# Patient Record
Sex: FEMALE | Race: WHITE | Employment: OTHER | URBAN - METROPOLITAN AREA
[De-identification: names, ages, dates, MRNs, and addresses within clinical notes are randomized per-mention and may not be internally consistent; named-entity substitution may affect disease eponyms.]

---

## 2017-09-12 ENCOUNTER — TELEPHONE (OUTPATIENT)
Dept: SURGERY | Age: 77
End: 2017-09-12

## 2017-09-12 DIAGNOSIS — Z12.31 ENCOUNTER FOR SCREENING MAMMOGRAM FOR BREAST CANCER: Primary | ICD-10-CM

## 2017-09-13 ENCOUNTER — HOSPITAL ENCOUNTER (OUTPATIENT)
Dept: WOMENS IMAGING | Age: 77
Discharge: HOME | End: 2017-09-13
Payer: MEDICARE

## 2017-09-13 DIAGNOSIS — Z12.31 ENCOUNTER FOR SCREENING MAMMOGRAM FOR BREAST CANCER: ICD-10-CM

## 2017-09-13 PROCEDURE — G0202 SCR MAMMO BI INCL CAD: HCPCS

## 2017-09-14 ENCOUNTER — TELEPHONE (OUTPATIENT)
Dept: SURGERY | Age: 77
End: 2017-09-14

## 2017-09-14 DIAGNOSIS — R92.8 ABNORMAL MAMMOGRAM: Primary | ICD-10-CM

## 2017-09-21 ENCOUNTER — HOSPITAL ENCOUNTER (OUTPATIENT)
Dept: WOMENS IMAGING | Age: 77
Discharge: HOME | End: 2017-09-21
Payer: MEDICARE

## 2017-09-21 ENCOUNTER — HOSPITAL ENCOUNTER (OUTPATIENT)
Dept: ULTRASOUND IMAGING | Age: 77
End: 2017-09-21
Payer: MEDICARE

## 2017-09-21 DIAGNOSIS — R92.8 ABNORMAL MAMMOGRAM: ICD-10-CM

## 2017-09-21 PROCEDURE — G0204 DX MAMMO INCL CAD BI: HCPCS

## 2018-05-23 ENCOUNTER — TELEPHONE (OUTPATIENT)
Dept: SURGERY | Age: 78
End: 2018-05-23

## 2018-05-23 DIAGNOSIS — Z09 FOLLOW-UP EXAM, 3-6 MONTHS SINCE PREVIOUS EXAM: Primary | ICD-10-CM

## 2018-05-23 DIAGNOSIS — R92.8 ABNORMAL MAMMOGRAM OF BOTH BREASTS: ICD-10-CM

## 2018-05-23 DIAGNOSIS — R92.8 ABNORMAL MAMMOGRAM: ICD-10-CM

## 2018-06-01 ENCOUNTER — HOSPITAL ENCOUNTER (OUTPATIENT)
Dept: WOMENS IMAGING | Age: 78
Discharge: HOME | End: 2018-06-03
Payer: MEDICARE

## 2018-06-01 DIAGNOSIS — R92.8 ABNORMAL MAMMOGRAM: ICD-10-CM

## 2018-06-01 PROCEDURE — 77066 DX MAMMO INCL CAD BI: CPT

## 2019-05-17 DIAGNOSIS — R92.8 ABNORMAL MAMMOGRAM: Primary | ICD-10-CM

## 2019-05-30 ENCOUNTER — HOSPITAL ENCOUNTER (OUTPATIENT)
Dept: WOMENS IMAGING | Age: 79
Discharge: HOME | End: 2019-06-01
Payer: MEDICARE

## 2019-05-30 DIAGNOSIS — R92.8 ABNORMAL MAMMOGRAM: ICD-10-CM

## 2019-05-30 PROCEDURE — 77066 DX MAMMO INCL CAD BI: CPT

## 2023-03-02 PROBLEM — E55.9 VITAMIN D DEFICIENCY: Status: ACTIVE | Noted: 2023-03-02

## 2023-03-02 PROBLEM — J30.9 ALLERGIC RHINITIS: Status: ACTIVE | Noted: 2023-03-02

## 2023-03-02 PROBLEM — M19.90 OSTEOARTHRITIS: Status: ACTIVE | Noted: 2023-03-02

## 2023-03-02 PROBLEM — E03.9 ACQUIRED HYPOTHYROIDISM: Status: ACTIVE | Noted: 2023-03-02

## 2023-03-02 PROBLEM — E78.2 MIXED HYPERLIPIDEMIA: Status: ACTIVE | Noted: 2023-03-02

## 2023-03-02 PROBLEM — M13.0 POLYARTHRITIS: Status: ACTIVE | Noted: 2023-03-02

## 2023-03-02 PROBLEM — M65.4 TENOSYNOVITIS, DE QUERVAIN: Status: ACTIVE | Noted: 2023-03-02

## 2023-03-02 PROBLEM — M05.79 RHEUMATOID ARTHRITIS INVOLVING MULTIPLE SITES WITH POSITIVE RHEUMATOID FACTOR (MULTI): Status: ACTIVE | Noted: 2023-03-02

## 2023-03-02 PROBLEM — G89.29 CHRONIC PAIN OF RIGHT WRIST: Status: ACTIVE | Noted: 2023-03-02

## 2023-03-02 PROBLEM — M25.531 CHRONIC PAIN OF RIGHT WRIST: Status: ACTIVE | Noted: 2023-03-02

## 2023-03-02 PROBLEM — R05.3 CHRONIC COUGH: Status: ACTIVE | Noted: 2023-03-02

## 2023-03-02 PROBLEM — M85.80 OSTEOPENIA: Status: ACTIVE | Noted: 2023-03-02

## 2023-03-02 PROBLEM — N30.20 CHRONIC CYSTITIS: Status: ACTIVE | Noted: 2023-03-02

## 2023-03-02 PROBLEM — I10 ESSENTIAL HYPERTENSION: Status: ACTIVE | Noted: 2023-03-02

## 2023-03-02 PROBLEM — J45.909 REACTIVE AIRWAY DISEASE (HHS-HCC): Status: ACTIVE | Noted: 2023-03-02

## 2023-03-02 RX ORDER — GINSENG 100 MG
1 CAPSULE ORAL DAILY
COMMUNITY

## 2023-03-02 RX ORDER — LEVOTHYROXINE SODIUM 50 UG/1
1 TABLET ORAL DAILY
COMMUNITY
Start: 2021-07-14 | End: 2023-04-17 | Stop reason: SDUPTHER

## 2023-03-02 RX ORDER — CHOLESTYRAMINE 4 G/4.8G
4 POWDER, FOR SUSPENSION ORAL
COMMUNITY
Start: 2021-11-12 | End: 2023-07-24 | Stop reason: ALTCHOICE

## 2023-03-02 RX ORDER — FOLIC ACID/MULTIVIT,IRON,MINER 0.4MG-18MG
1 TABLET ORAL DAILY
COMMUNITY

## 2023-03-02 RX ORDER — BIOTIN 10 MG
1 TABLET ORAL DAILY
COMMUNITY

## 2023-03-02 RX ORDER — LOSARTAN POTASSIUM AND HYDROCHLOROTHIAZIDE 25; 100 MG/1; MG/1
1 TABLET ORAL DAILY
COMMUNITY
Start: 2021-08-19 | End: 2023-04-17 | Stop reason: SDUPTHER

## 2023-03-02 RX ORDER — ALBUTEROL SULFATE 90 UG/1
2 AEROSOL, METERED RESPIRATORY (INHALATION) EVERY 6 HOURS PRN
COMMUNITY
Start: 2022-06-19

## 2023-04-17 ENCOUNTER — LAB (OUTPATIENT)
Dept: LAB | Facility: LAB | Age: 83
End: 2023-04-17
Payer: MEDICARE

## 2023-04-17 ENCOUNTER — OFFICE VISIT (OUTPATIENT)
Dept: PRIMARY CARE | Facility: CLINIC | Age: 83
End: 2023-04-17
Payer: MEDICARE

## 2023-04-17 VITALS
DIASTOLIC BLOOD PRESSURE: 80 MMHG | HEIGHT: 64 IN | WEIGHT: 222 LBS | BODY MASS INDEX: 37.9 KG/M2 | RESPIRATION RATE: 16 BRPM | SYSTOLIC BLOOD PRESSURE: 124 MMHG | HEART RATE: 72 BPM | TEMPERATURE: 97.2 F

## 2023-04-17 DIAGNOSIS — E03.9 ACQUIRED HYPOTHYROIDISM: ICD-10-CM

## 2023-04-17 DIAGNOSIS — L85.3 XEROSIS CUTIS: ICD-10-CM

## 2023-04-17 DIAGNOSIS — M05.79 RHEUMATOID ARTHRITIS INVOLVING MULTIPLE SITES WITH POSITIVE RHEUMATOID FACTOR (MULTI): ICD-10-CM

## 2023-04-17 DIAGNOSIS — E78.2 MIXED HYPERLIPIDEMIA: ICD-10-CM

## 2023-04-17 DIAGNOSIS — I10 ESSENTIAL HYPERTENSION: ICD-10-CM

## 2023-04-17 DIAGNOSIS — I10 ESSENTIAL HYPERTENSION: Primary | ICD-10-CM

## 2023-04-17 LAB
ALANINE AMINOTRANSFERASE (SGPT) (U/L) IN SER/PLAS: 19 U/L (ref 7–45)
ALBUMIN (G/DL) IN SER/PLAS: 3.9 G/DL (ref 3.4–5)
ALKALINE PHOSPHATASE (U/L) IN SER/PLAS: 60 U/L (ref 33–136)
ANION GAP IN SER/PLAS: 11 MMOL/L (ref 10–20)
ASPARTATE AMINOTRANSFERASE (SGOT) (U/L) IN SER/PLAS: 16 U/L (ref 9–39)
BILIRUBIN TOTAL (MG/DL) IN SER/PLAS: 0.6 MG/DL (ref 0–1.2)
CALCIUM (MG/DL) IN SER/PLAS: 9.2 MG/DL (ref 8.6–10.3)
CARBON DIOXIDE, TOTAL (MMOL/L) IN SER/PLAS: 27 MMOL/L (ref 21–32)
CHLORIDE (MMOL/L) IN SER/PLAS: 105 MMOL/L (ref 98–107)
CHOLESTEROL (MG/DL) IN SER/PLAS: 176 MG/DL (ref 0–199)
CHOLESTEROL IN HDL (MG/DL) IN SER/PLAS: 53.3 MG/DL
CHOLESTEROL/HDL RATIO: 3.3
CREATININE (MG/DL) IN SER/PLAS: 0.78 MG/DL (ref 0.5–1.05)
GFR FEMALE: 76 ML/MIN/1.73M2
GLUCOSE (MG/DL) IN SER/PLAS: 92 MG/DL (ref 74–99)
LDL: 89 MG/DL (ref 0–99)
POTASSIUM (MMOL/L) IN SER/PLAS: 4.3 MMOL/L (ref 3.5–5.3)
PROTEIN TOTAL: 6.5 G/DL (ref 6.4–8.2)
SODIUM (MMOL/L) IN SER/PLAS: 139 MMOL/L (ref 136–145)
THYROTROPIN (MIU/L) IN SER/PLAS BY DETECTION LIMIT <= 0.05 MIU/L: 4.33 MIU/L (ref 0.44–3.98)
THYROXINE (T4) FREE (NG/DL) IN SER/PLAS: 1.41 NG/DL (ref 0.61–1.12)
TRIGLYCERIDE (MG/DL) IN SER/PLAS: 170 MG/DL (ref 0–149)
UREA NITROGEN (MG/DL) IN SER/PLAS: 16 MG/DL (ref 6–23)
VLDL: 34 MG/DL (ref 0–40)

## 2023-04-17 PROCEDURE — 36415 COLL VENOUS BLD VENIPUNCTURE: CPT

## 2023-04-17 PROCEDURE — 80053 COMPREHEN METABOLIC PANEL: CPT

## 2023-04-17 PROCEDURE — 99214 OFFICE O/P EST MOD 30 MIN: CPT | Performed by: FAMILY MEDICINE

## 2023-04-17 PROCEDURE — 3079F DIAST BP 80-89 MM HG: CPT | Performed by: FAMILY MEDICINE

## 2023-04-17 PROCEDURE — 84443 ASSAY THYROID STIM HORMONE: CPT

## 2023-04-17 PROCEDURE — 1160F RVW MEDS BY RX/DR IN RCRD: CPT | Performed by: FAMILY MEDICINE

## 2023-04-17 PROCEDURE — 1036F TOBACCO NON-USER: CPT | Performed by: FAMILY MEDICINE

## 2023-04-17 PROCEDURE — 3074F SYST BP LT 130 MM HG: CPT | Performed by: FAMILY MEDICINE

## 2023-04-17 PROCEDURE — 1159F MED LIST DOCD IN RCRD: CPT | Performed by: FAMILY MEDICINE

## 2023-04-17 PROCEDURE — 80061 LIPID PANEL: CPT

## 2023-04-17 PROCEDURE — 84439 ASSAY OF FREE THYROXINE: CPT

## 2023-04-17 RX ORDER — LEVOTHYROXINE SODIUM 50 UG/1
50 TABLET ORAL DAILY
Qty: 90 TABLET | Refills: 1 | Status: SHIPPED | OUTPATIENT
Start: 2023-04-17 | End: 2023-04-25 | Stop reason: DRUGHIGH

## 2023-04-17 RX ORDER — LOSARTAN POTASSIUM AND HYDROCHLOROTHIAZIDE 25; 100 MG/1; MG/1
1 TABLET ORAL DAILY
Qty: 90 TABLET | Refills: 1 | Status: SHIPPED | OUTPATIENT
Start: 2023-04-17 | End: 2023-10-01

## 2023-04-17 RX ORDER — AMMONIUM LACTATE 12 G/100G
LOTION TOPICAL AS NEEDED
Qty: 225 G | Refills: 3 | Status: SHIPPED | OUTPATIENT
Start: 2023-04-17

## 2023-04-17 ASSESSMENT — PATIENT HEALTH QUESTIONNAIRE - PHQ9
2. FEELING DOWN, DEPRESSED OR HOPELESS: NOT AT ALL
SUM OF ALL RESPONSES TO PHQ9 QUESTIONS 1 AND 2: 0
1. LITTLE INTEREST OR PLEASURE IN DOING THINGS: NOT AT ALL

## 2023-04-20 DIAGNOSIS — E03.9 ACQUIRED HYPOTHYROIDISM: ICD-10-CM

## 2023-04-24 ENCOUNTER — LAB (OUTPATIENT)
Dept: LAB | Facility: LAB | Age: 83
End: 2023-04-24
Payer: MEDICARE

## 2023-04-24 DIAGNOSIS — E03.9 ACQUIRED HYPOTHYROIDISM: ICD-10-CM

## 2023-04-24 LAB
THYROTROPIN (MIU/L) IN SER/PLAS BY DETECTION LIMIT <= 0.05 MIU/L: 4.67 MIU/L (ref 0.44–3.98)
THYROXINE (T4) FREE (NG/DL) IN SER/PLAS: 0.93 NG/DL (ref 0.61–1.12)

## 2023-04-24 PROCEDURE — 84439 ASSAY OF FREE THYROXINE: CPT

## 2023-04-24 PROCEDURE — 84443 ASSAY THYROID STIM HORMONE: CPT

## 2023-04-24 PROCEDURE — 36415 COLL VENOUS BLD VENIPUNCTURE: CPT

## 2023-04-25 DIAGNOSIS — E03.9 ACQUIRED HYPOTHYROIDISM: Primary | ICD-10-CM

## 2023-04-25 RX ORDER — LEVOTHYROXINE SODIUM 75 UG/1
75 TABLET ORAL DAILY
Qty: 90 TABLET | Refills: 0 | Status: SHIPPED | OUTPATIENT
Start: 2023-04-25 | End: 2023-05-01 | Stop reason: SDUPTHER

## 2023-05-01 ENCOUNTER — TELEPHONE (OUTPATIENT)
Dept: PRIMARY CARE | Facility: CLINIC | Age: 83
End: 2023-05-01
Payer: MEDICARE

## 2023-05-01 DIAGNOSIS — E03.9 ACQUIRED HYPOTHYROIDISM: ICD-10-CM

## 2023-05-01 RX ORDER — LEVOTHYROXINE SODIUM 75 UG/1
75 TABLET ORAL DAILY
Qty: 90 TABLET | Refills: 1 | Status: SHIPPED | OUTPATIENT
Start: 2023-05-01 | End: 2023-10-18 | Stop reason: SDUPTHER

## 2023-05-01 NOTE — TELEPHONE ENCOUNTER
Patient called in stating that she has been receiving Levothyroxine 50 mcg instead of the 75 mcg in her chart. She is requesting the correct dosage be sent to [facility]  Please Advise

## 2023-05-10 DIAGNOSIS — E78.2 MIXED HYPERLIPIDEMIA: ICD-10-CM

## 2023-05-10 RX ORDER — CHOLESTYRAMINE 4 G/5.5G
POWDER, FOR SUSPENSION ORAL
Qty: 462 G | Refills: 1 | Status: SHIPPED | OUTPATIENT
Start: 2023-05-10 | End: 2023-06-12 | Stop reason: SDUPTHER

## 2023-06-12 DIAGNOSIS — E78.2 MIXED HYPERLIPIDEMIA: ICD-10-CM

## 2023-06-12 RX ORDER — CHOLESTYRAMINE 4 G/5.5G
POWDER, FOR SUSPENSION ORAL
Qty: 462 G | Refills: 1 | Status: SHIPPED | OUTPATIENT
Start: 2023-06-12 | End: 2023-08-18

## 2023-06-12 NOTE — TELEPHONE ENCOUNTER
Rx Refill Request PATIENT CALLED IN    Name: Rosana Hopkins  :  1940   Medication Name:  PREVALITE 4 GRAM  Specific Pharmacy location: [facility]  Date of last appointment:  2023  Date of next appointment:  2023  Best number to reach patient:  [phone]

## 2023-07-24 ENCOUNTER — OFFICE VISIT (OUTPATIENT)
Dept: PRIMARY CARE | Facility: CLINIC | Age: 83
End: 2023-07-24
Payer: MEDICARE

## 2023-07-24 VITALS
BODY MASS INDEX: 36.99 KG/M2 | WEIGHT: 222 LBS | TEMPERATURE: 97.6 F | SYSTOLIC BLOOD PRESSURE: 122 MMHG | OXYGEN SATURATION: 95 % | RESPIRATION RATE: 16 BRPM | HEIGHT: 65 IN | DIASTOLIC BLOOD PRESSURE: 80 MMHG | HEART RATE: 71 BPM

## 2023-07-24 DIAGNOSIS — M05.79 RHEUMATOID ARTHRITIS INVOLVING MULTIPLE SITES WITH POSITIVE RHEUMATOID FACTOR (MULTI): ICD-10-CM

## 2023-07-24 DIAGNOSIS — I10 ESSENTIAL HYPERTENSION: Primary | ICD-10-CM

## 2023-07-24 DIAGNOSIS — E03.9 ACQUIRED HYPOTHYROIDISM: ICD-10-CM

## 2023-07-24 DIAGNOSIS — E78.2 MIXED HYPERLIPIDEMIA: ICD-10-CM

## 2023-07-24 DIAGNOSIS — R60.0 BILATERAL LEG EDEMA: ICD-10-CM

## 2023-07-24 DIAGNOSIS — E66.01 CLASS 2 SEVERE OBESITY DUE TO EXCESS CALORIES WITH SERIOUS COMORBIDITY AND BODY MASS INDEX (BMI) OF 36.0 TO 36.9 IN ADULT (MULTI): ICD-10-CM

## 2023-07-24 DIAGNOSIS — L73.9 FOLLICULITIS: ICD-10-CM

## 2023-07-24 PROBLEM — E66.812 CLASS 2 SEVERE OBESITY DUE TO EXCESS CALORIES WITH SERIOUS COMORBIDITY AND BODY MASS INDEX (BMI) OF 36.0 TO 36.9 IN ADULT: Status: ACTIVE | Noted: 2023-07-24

## 2023-07-24 PROCEDURE — 99214 OFFICE O/P EST MOD 30 MIN: CPT | Performed by: FAMILY MEDICINE

## 2023-07-24 PROCEDURE — 3074F SYST BP LT 130 MM HG: CPT | Performed by: FAMILY MEDICINE

## 2023-07-24 PROCEDURE — 3079F DIAST BP 80-89 MM HG: CPT | Performed by: FAMILY MEDICINE

## 2023-07-24 PROCEDURE — 1159F MED LIST DOCD IN RCRD: CPT | Performed by: FAMILY MEDICINE

## 2023-07-24 PROCEDURE — 1036F TOBACCO NON-USER: CPT | Performed by: FAMILY MEDICINE

## 2023-07-24 PROCEDURE — 1160F RVW MEDS BY RX/DR IN RCRD: CPT | Performed by: FAMILY MEDICINE

## 2023-07-24 RX ORDER — FUROSEMIDE 20 MG/1
20 TABLET ORAL DAILY
Qty: 30 TABLET | Refills: 1 | Status: SHIPPED | OUTPATIENT
Start: 2023-07-24 | End: 2024-01-27 | Stop reason: ALTCHOICE

## 2023-07-24 RX ORDER — AMOXICILLIN AND CLAVULANATE POTASSIUM 875; 125 MG/1; MG/1
875 TABLET, FILM COATED ORAL 2 TIMES DAILY
Qty: 20 TABLET | Refills: 0 | Status: SHIPPED | OUTPATIENT
Start: 2023-07-24 | End: 2023-08-03

## 2023-07-24 RX ORDER — POTASSIUM CHLORIDE 750 MG/1
10 TABLET, FILM COATED, EXTENDED RELEASE ORAL DAILY
Qty: 30 TABLET | Refills: 1 | Status: SHIPPED | OUTPATIENT
Start: 2023-07-24 | End: 2024-01-27 | Stop reason: ALTCHOICE

## 2023-07-24 RX ORDER — MUPIROCIN 20 MG/G
OINTMENT TOPICAL 3 TIMES DAILY
Qty: 22 G | Refills: 0 | Status: SHIPPED | OUTPATIENT
Start: 2023-07-24 | End: 2023-08-03

## 2023-07-24 NOTE — PROGRESS NOTES
Chief Complaint   Patient presents with    Follow-up     Hypertension, Hyperlipidemia, Hypothyroidism and other chronic medical conditions    Edema     Rosana Hopkins is a 82 y.o. female who presents today for follow up on   hypertension and hyperlipidemia. She is exercising and is adherent to a low-salt diet. Patient denies chest pain, dyspnea, exertional chest pressure/discomfort, near-syncope, orthopnea, palpitations, paroxysmal nocturnal dyspnea, and syncope. Taking her medication regularly with no side effects.    Patient complains of edema in both lower legs and and bilateral ankles. The edema has been moderate. Onset of symptoms was 2 weeks ago, and patient reports symptoms have gradually worsened since that time. The edema is present all day. The patient states the problem has been intermittent for 1 month. The swelling has been aggravated by hot weather. The swelling has been relieved by elevation of involved area. Associated factors include: nothing. Cardiac risk factors include hypertension.    Patient Active Problem List   Diagnosis    Acquired hypothyroidism    Allergic rhinitis    Chronic cough    Chronic cystitis    Chronic pain of right wrist    Essential hypertension    Mixed hyperlipidemia    Osteopenia    Osteoarthritis    Polyarthritis    Reactive airway disease    Rheumatoid arthritis involving multiple sites with positive rheumatoid factor (CMS/[facility])    Tenosynovitis, de Quervain    Vitamin D deficiency    Xerosis cutis    Class 2 severe obesity due to excess calories with serious comorbidity and body mass index (BMI) of 36.0 to 36.9 in adult (CMS/[facility])           Past Medical History:   Diagnosis Date    Xerosis of skin 01/10/2022        Current Outpatient Medications   Medication Sig Dispense Refill    albuterol 90 mcg/actuation inhaler Inhale 2 puffs every 6 hours if needed for shortness of breath.      ammonium lactate (Lac-Hydrin) 12 % lotion Apply topically if needed for dry skin. 225 g 3     biotin 10 mg tablet Take 1 tablet (10 mg) by mouth once daily.      cholestyramine-aspartame (Prevalite) 4 gram powder MIX 1 SCOOP WITH LIQUID AND  DRINK BY MOUTH ONCE DAILY 462 g 1    cranberry extract 200 mg capsule Take 1 capsule by mouth in the morning and 1 capsule before bedtime.      krill-omega-3-dha-epa-lipids 444-00-49-50 mg capsule Take 1 capsule by mouth once daily.      levothyroxine (Synthroid, Levoxyl) 75 mcg tablet Take 1 tablet (75 mcg) by mouth once daily. 90 tablet 1    losartan-hydrochlorothiazide (Hyzaar) 100-25 mg tablet Take 1 tablet by mouth once daily. 90 tablet 1    amoxicillin-pot clavulanate (Augmentin) 875-125 mg tablet Take 1 tablet (875 mg) by mouth 2 times a day for 10 days. 20 tablet 0    furosemide (Lasix) 20 mg tablet Take 1 tablet (20 mg) by mouth once daily. 30 tablet 1    mupirocin (Bactroban) 2 % ointment Apply topically 3 times a day for 10 days. apply to affected area 22 g 0    potassium chloride CR (Klor-Con) 10 mEq ER tablet Take 1 tablet (10 mEq) by mouth once daily. Do not crush, chew, or split. 30 tablet 1     No current facility-administered medications for this visit.       No Known Allergies    Social History     Tobacco Use    Smoking status: Never     Passive exposure: Never    Smokeless tobacco: Never   Substance Use Topics    Alcohol use: Yes        Review of Systems - General ROS: negative for - fatigue, fever, malaise, night sweats, sleep disturbance or weight loss  Psychological ROS: negative for - anxiety, concentration difficulties, depression, memory difficulties or sleep disturbances  ENT ROS: negative for - hearing change, nasal discharge, oral lesions, sinus pain, sore throat, tinnitus or vertigo  Allergy and Immunology ROS: negative for - hives, nasal congestion or seasonal allergies  Hematological and Lymphatic ROS: negative for - bruising, fatigue, night sweats or pallor  Endocrine ROS: negative for - hot flashes, malaise/lethargy, palpitations,  "polydipsia/polyuria, skin changes, temperature intolerance or unexpected weight changes  Respiratory ROS: negative for - cough, hemoptysis, pleuritic pain, shortness of breath or wheezing  Cardiovascular ROS: no chest pain or dyspnea on exertion  Gastrointestinal ROS: no abdominal pain, change in bowel habits, or black or bloody stools  Genito-Urinary ROS: no dysuria, trouble voiding, or hematuria  Musculoskeletal ROS: negative for - joint pain, joint stiffness, joint swelling, muscle pain or muscular weakness  Neurological ROS: negative for - dizziness, gait disturbance, headaches, impaired coordination/balance, numbness/tingling, tremors or visual changes    Objective:  Blood pressure 122/80, pulse 71, temperature 36.4 °C (97.6 °F), temperature source Temporal, resp. rate 16, height 1.651 m (5' 5\"), weight 101 kg (222 lb), SpO2 95 %.    Physical Examination: General appearance - alert, well appearing, and in no distress  Mental status - alert, oriented to person, place, and time  Eyes - pupils equal and reactive, extraocular eye movements intact  Ears - bilateral TM's and external ear canals normal  Mouth - mucous membranes moist, pharynx normal without lesions  Neck The neck is supple and free of adenopathy or masses, the thyroid is normal without enlargement or nodules.  Lymphatics - no palpable lymphadenopathy, no hepatosplenomegaly  Chest - clear to auscultation, no wheezes, rales or rhonchi, symmetric air entry  Heart - normal rate, regular rhythm, normal S1, S2, no murmurs, rubs, clicks or gallops  Abdomen - soft, nontender, nondistended, no masses or organomegaly  Neurological - alert, oriented, normal speech, no focal findings or movement disorder noted  Musculoskeletal - no joint tenderness, deformity or swelling  Extremities: peripheral pulses normal, no pedal edema, no clubbing or cyanosis, intact peripheral pulses.  Neurological exam reveals alert, oriented, normal speech, no focal findings or movement " disorder noted, cranial nerves II through XII intact, motor and sensory grossly normal bilaterally, no focal deficit..  Extremities reveals 1+ bilateral pretibial edema with 2+ pedal edema bilaterally.  No cyanosis or clubbing.    Lab on 04/24/2023   Component Date Value Ref Range Status    TSH 04/24/2023 4.67 (H)  0.44 - 3.98 mIU/L Final     TSH testing is performed using different testing    methodology at [facility] than at other    system [facility]. Direct result comparisons should    only be made within the same method.    Free T4 04/24/2023 0.93  0.61 - 1.12 ng/dL Final     Thyroxine Free testing is performed using different testing    methodology at [facility] than at other    [facility]. Direct result comparisons should    only be made within the same method.  .   Biotin can cause falsely elevated free T4 results. Patients   taking a Biotin dose of up to 10 mg/day should refrain from   taking Biotin for 24 hours before sample collection. Patient   taking a Biotin dose of >10 mg/day should consult with their   physician or the laboratory before the blood draw.         Assessment / Plan:  Problem List Items Addressed This Visit       Acquired hypothyroidism    Relevant Orders    Follow Up In [facility] - PCP - Established    Class 2 severe obesity due to excess calories with serious comorbidity and body mass index (BMI) of 36.0 to 36.9 in adult (CMS/[facility])    Current Assessment & Plan     Continue decrease calorie diet and not more than 1500 calorie per day diet and low-fat diet.  Continue with regular exercise program.  We advised exercise at least 5 days a week for at least 45 minutes and also a minimum of 10,000 steps a day.  The detrimental effects of obesity on health were discussed.           Essential hypertension - Primary    Current Assessment & Plan     Dietary sodium restriction.  Regular aerobic exercise program is recommended to help achieve and maintain  "normal body weight, fitness and improve lipid balance. .  Dietary changes: Increase soluble fiber  Plant sterols 2grams per day (e.g. Benecol)  Reduce saturated fat, \"trans\" monounsaturated fatty acids, and cholesterol           Relevant Orders    Follow Up In [facility] - PCP - Established    Mixed hyperlipidemia    Current Assessment & Plan     The nature of cardiac risk has been fully discussed with this patient. Discussed cardiovascular risk analysis and appropriate diet with the need for lifelong measures to reduce the risk. A regular exercise program is recommended to help achieve and maintain normal body weight, fitness and improve lipid balance. Patient education provided. They understand and agree with this course of treatment. They will return with new or worsening symptoms. Patient instructed to remain current with appropriate annual health maintenance.            Relevant Orders    Follow Up In [facility] - PCP - Established    Rheumatoid arthritis involving multiple sites with positive rheumatoid factor (CMS/[facility])    Current Assessment & Plan     Chronic Condition Documentation : Stable based on symptoms and exam.  Continue established treatment plan and follow-up at least yearly.            Other Visit Diagnoses       Folliculitis        Relevant Medications    amoxicillin-pot clavulanate (Augmentin) 875-125 mg tablet    mupirocin (Bactroban) 2 % ointment    Bilateral leg edema        Relevant Medications    furosemide (Lasix) 20 mg tablet    potassium chloride CR (Klor-Con) 10 mEq ER tablet             Outpatient Encounter Medications as of 7/24/2023   Medication Sig Dispense Refill    albuterol 90 mcg/actuation inhaler Inhale 2 puffs every 6 hours if needed for shortness of breath.      ammonium lactate (Lac-Hydrin) 12 % lotion Apply topically if needed for dry skin. 225 g 3    biotin 10 mg tablet Take 1 tablet (10 mg) by mouth once daily.      cholestyramine-aspartame " (Prevalite) 4 gram powder MIX 1 SCOOP WITH LIQUID AND  DRINK BY MOUTH ONCE DAILY 462 g 1    cranberry extract 200 mg capsule Take 1 capsule by mouth in the morning and 1 capsule before bedtime.      krill-omega-3-dha-epa-lipids 086-48-37-50 mg capsule Take 1 capsule by mouth once daily.      levothyroxine (Synthroid, Levoxyl) 75 mcg tablet Take 1 tablet (75 mcg) by mouth once daily. 90 tablet 1    losartan-hydrochlorothiazide (Hyzaar) 100-25 mg tablet Take 1 tablet by mouth once daily. 90 tablet 1    amoxicillin-pot clavulanate (Augmentin) 875-125 mg tablet Take 1 tablet (875 mg) by mouth 2 times a day for 10 days. 20 tablet 0    furosemide (Lasix) 20 mg tablet Take 1 tablet (20 mg) by mouth once daily. 30 tablet 1    mupirocin (Bactroban) 2 % ointment Apply topically 3 times a day for 10 days. apply to affected area 22 g 0    potassium chloride CR (Klor-Con) 10 mEq ER tablet Take 1 tablet (10 mEq) by mouth once daily. Do not crush, chew, or split. 30 tablet 1    [DISCONTINUED] cholestyramine light (Prevalite) 4 gram packet Take 1 packet (4 g) by mouth once daily.       No facility-administered encounter medications on file as of 7/24/2023.      Scribe Attestation  By signing my name below, I, Carolina Moffett   attest that this documentation has been prepared under the direction and in the presence of Ismael Gan MD.

## 2023-07-24 NOTE — PATIENT INSTRUCTIONS
BMI was above normal measurement. Current weight: 101 kg (222 lb)  Weight change since last visit (-) denotes wt loss 0 lbs   Weight loss needed to achieve BMI 25: 72.1 Lbs  Weight loss needed to achieve BMI 30: 42.1 Lbs  Advised to Increase physical activity.

## 2023-07-24 NOTE — ASSESSMENT & PLAN NOTE
Recommendations: decrease sodium in the diet, elevate feet above the level of the heart whenever possible, increase physical activity, use of compression stockings, and weight loss.  The patient was also instructed to call IMMEDIATELY (i.e., day or night) if any cardiopulmonary symptoms occur, especially chest pain, shortness of breath, dyspnea on exertion, paroxysmal nocturnal dyspnea, or orthopnea, and these were explained.

## 2023-07-25 ENCOUNTER — LAB (OUTPATIENT)
Dept: LAB | Facility: LAB | Age: 83
End: 2023-07-25
Payer: MEDICARE

## 2023-07-25 DIAGNOSIS — E78.2 MIXED HYPERLIPIDEMIA: ICD-10-CM

## 2023-07-25 DIAGNOSIS — E03.9 ACQUIRED HYPOTHYROIDISM: Primary | ICD-10-CM

## 2023-07-25 DIAGNOSIS — I10 ESSENTIAL HYPERTENSION: ICD-10-CM

## 2023-07-25 LAB
THYROTROPIN (MIU/L) IN SER/PLAS BY DETECTION LIMIT <= 0.05 MIU/L: 3.17 MIU/L (ref 0.44–3.98)
THYROXINE (T4) FREE (NG/DL) IN SER/PLAS: 1.34 NG/DL (ref 0.61–1.12)

## 2023-07-25 PROCEDURE — 36415 COLL VENOUS BLD VENIPUNCTURE: CPT

## 2023-07-25 PROCEDURE — 84439 ASSAY OF FREE THYROXINE: CPT

## 2023-07-25 PROCEDURE — 84443 ASSAY THYROID STIM HORMONE: CPT

## 2023-08-18 DIAGNOSIS — E78.2 MIXED HYPERLIPIDEMIA: ICD-10-CM

## 2023-08-18 RX ORDER — CHOLESTYRAMINE 4 G/5.5G
POWDER, FOR SUSPENSION ORAL
Qty: 693 G | Refills: 2 | Status: SHIPPED | OUTPATIENT
Start: 2023-08-18 | End: 2024-04-15 | Stop reason: SDUPTHER

## 2023-10-01 DIAGNOSIS — I10 ESSENTIAL HYPERTENSION: ICD-10-CM

## 2023-10-01 RX ORDER — LOSARTAN POTASSIUM AND HYDROCHLOROTHIAZIDE 25; 100 MG/1; MG/1
1 TABLET ORAL DAILY
Qty: 90 TABLET | Refills: 1 | Status: SHIPPED | OUTPATIENT
Start: 2023-10-01 | End: 2024-04-05 | Stop reason: SDUPTHER

## 2023-10-13 ENCOUNTER — LAB (OUTPATIENT)
Dept: LAB | Facility: LAB | Age: 83
End: 2023-10-13
Payer: MEDICARE

## 2023-10-13 DIAGNOSIS — E03.9 ACQUIRED HYPOTHYROIDISM: ICD-10-CM

## 2023-10-13 DIAGNOSIS — E78.2 MIXED HYPERLIPIDEMIA: ICD-10-CM

## 2023-10-13 DIAGNOSIS — I10 ESSENTIAL HYPERTENSION: ICD-10-CM

## 2023-10-13 LAB
ALBUMIN SERPL BCP-MCNC: 3.9 G/DL (ref 3.4–5)
ALP SERPL-CCNC: 59 U/L (ref 33–136)
ALT SERPL W P-5'-P-CCNC: 13 U/L (ref 7–45)
ANION GAP SERPL CALC-SCNC: 13 MMOL/L (ref 10–20)
AST SERPL W P-5'-P-CCNC: 15 U/L (ref 9–39)
BASOPHILS # BLD AUTO: 0.05 X10*3/UL (ref 0–0.1)
BASOPHILS NFR BLD AUTO: 0.6 %
BILIRUB SERPL-MCNC: 0.5 MG/DL (ref 0–1.2)
BUN SERPL-MCNC: 27 MG/DL (ref 6–23)
CALCIUM SERPL-MCNC: 9.5 MG/DL (ref 8.6–10.3)
CHLORIDE SERPL-SCNC: 104 MMOL/L (ref 98–107)
CHOLEST SERPL-MCNC: 165 MG/DL (ref 0–199)
CHOLESTEROL/HDL RATIO: 3
CO2 SERPL-SCNC: 27 MMOL/L (ref 21–32)
CREAT SERPL-MCNC: 0.95 MG/DL (ref 0.5–1.05)
EOSINOPHIL # BLD AUTO: 0.23 X10*3/UL (ref 0–0.4)
EOSINOPHIL NFR BLD AUTO: 3 %
ERYTHROCYTE [DISTWIDTH] IN BLOOD BY AUTOMATED COUNT: 13.7 % (ref 11.5–14.5)
GFR SERPL CREATININE-BSD FRML MDRD: 60 ML/MIN/1.73M*2
GLUCOSE SERPL-MCNC: 83 MG/DL (ref 74–99)
HCT VFR BLD AUTO: 44.9 % (ref 36–46)
HDLC SERPL-MCNC: 55.3 MG/DL
HGB BLD-MCNC: 14 G/DL (ref 12–16)
IMM GRANULOCYTES # BLD AUTO: 0.02 X10*3/UL (ref 0–0.5)
IMM GRANULOCYTES NFR BLD AUTO: 0.3 % (ref 0–0.9)
LDLC SERPL CALC-MCNC: 92 MG/DL
LYMPHOCYTES # BLD AUTO: 2.1 X10*3/UL (ref 0.8–3)
LYMPHOCYTES NFR BLD AUTO: 27 %
MCH RBC QN AUTO: 28.9 PG (ref 26–34)
MCHC RBC AUTO-ENTMCNC: 31.2 G/DL (ref 32–36)
MCV RBC AUTO: 93 FL (ref 80–100)
MONOCYTES # BLD AUTO: 0.53 X10*3/UL (ref 0.05–0.8)
MONOCYTES NFR BLD AUTO: 6.8 %
NEUTROPHILS # BLD AUTO: 4.84 X10*3/UL (ref 1.6–5.5)
NEUTROPHILS NFR BLD AUTO: 62.3 %
NON HDL CHOLESTEROL: 110 MG/DL (ref 0–149)
NRBC BLD-RTO: 0 /100 WBCS (ref 0–0)
PLATELET # BLD AUTO: 244 X10*3/UL (ref 150–450)
PMV BLD AUTO: 10.9 FL (ref 7.5–11.5)
POTASSIUM SERPL-SCNC: 4.5 MMOL/L (ref 3.5–5.3)
PROT SERPL-MCNC: 6.8 G/DL (ref 6.4–8.2)
RBC # BLD AUTO: 4.85 X10*6/UL (ref 4–5.2)
SODIUM SERPL-SCNC: 139 MMOL/L (ref 136–145)
T4 FREE SERPL-MCNC: 3.25 NG/DL (ref 0.61–1.12)
TRIGL SERPL-MCNC: 90 MG/DL (ref 0–149)
TSH SERPL-ACNC: 2.86 MIU/L (ref 0.44–3.98)
VLDL: 18 MG/DL (ref 0–40)
WBC # BLD AUTO: 7.8 X10*3/UL (ref 4.4–11.3)

## 2023-10-13 PROCEDURE — 80061 LIPID PANEL: CPT

## 2023-10-13 PROCEDURE — 84439 ASSAY OF FREE THYROXINE: CPT

## 2023-10-13 PROCEDURE — 85025 COMPLETE CBC W/AUTO DIFF WBC: CPT

## 2023-10-13 PROCEDURE — 36415 COLL VENOUS BLD VENIPUNCTURE: CPT

## 2023-10-13 PROCEDURE — 84443 ASSAY THYROID STIM HORMONE: CPT

## 2023-10-13 PROCEDURE — 80053 COMPREHEN METABOLIC PANEL: CPT

## 2023-10-18 ENCOUNTER — OFFICE VISIT (OUTPATIENT)
Dept: PRIMARY CARE | Facility: CLINIC | Age: 83
End: 2023-10-18
Payer: MEDICARE

## 2023-10-18 VITALS
HEIGHT: 65 IN | BODY MASS INDEX: 36.32 KG/M2 | TEMPERATURE: 97.1 F | HEART RATE: 74 BPM | WEIGHT: 218 LBS | RESPIRATION RATE: 16 BRPM | DIASTOLIC BLOOD PRESSURE: 80 MMHG | SYSTOLIC BLOOD PRESSURE: 130 MMHG

## 2023-10-18 DIAGNOSIS — E78.2 MIXED HYPERLIPIDEMIA: ICD-10-CM

## 2023-10-18 DIAGNOSIS — Z23 NEED FOR INFLUENZA VACCINATION: ICD-10-CM

## 2023-10-18 DIAGNOSIS — Z00.00 ANNUAL PHYSICAL EXAM: Primary | ICD-10-CM

## 2023-10-18 DIAGNOSIS — I10 ESSENTIAL HYPERTENSION: ICD-10-CM

## 2023-10-18 DIAGNOSIS — E66.01 CLASS 2 SEVERE OBESITY DUE TO EXCESS CALORIES WITH SERIOUS COMORBIDITY AND BODY MASS INDEX (BMI) OF 36.0 TO 36.9 IN ADULT (MULTI): ICD-10-CM

## 2023-10-18 DIAGNOSIS — E03.9 ACQUIRED HYPOTHYROIDISM: ICD-10-CM

## 2023-10-18 PROCEDURE — 1159F MED LIST DOCD IN RCRD: CPT | Performed by: FAMILY MEDICINE

## 2023-10-18 PROCEDURE — G0008 ADMIN INFLUENZA VIRUS VAC: HCPCS | Performed by: FAMILY MEDICINE

## 2023-10-18 PROCEDURE — 99214 OFFICE O/P EST MOD 30 MIN: CPT | Performed by: FAMILY MEDICINE

## 2023-10-18 PROCEDURE — 1160F RVW MEDS BY RX/DR IN RCRD: CPT | Performed by: FAMILY MEDICINE

## 2023-10-18 PROCEDURE — 99397 PER PM REEVAL EST PAT 65+ YR: CPT | Performed by: FAMILY MEDICINE

## 2023-10-18 PROCEDURE — 90662 IIV NO PRSV INCREASED AG IM: CPT | Performed by: FAMILY MEDICINE

## 2023-10-18 PROCEDURE — 3079F DIAST BP 80-89 MM HG: CPT | Performed by: FAMILY MEDICINE

## 2023-10-18 PROCEDURE — 1036F TOBACCO NON-USER: CPT | Performed by: FAMILY MEDICINE

## 2023-10-18 PROCEDURE — 3075F SYST BP GE 130 - 139MM HG: CPT | Performed by: FAMILY MEDICINE

## 2023-10-18 RX ORDER — LEVOTHYROXINE SODIUM 75 UG/1
75 TABLET ORAL DAILY
Qty: 90 TABLET | Refills: 1 | Status: SHIPPED | OUTPATIENT
Start: 2023-10-18 | End: 2024-04-15 | Stop reason: SDUPTHER

## 2023-10-18 RX ORDER — LEVOTHYROXINE SODIUM 75 UG/1
75 TABLET ORAL DAILY
Qty: 90 TABLET | Refills: 1 | Status: CANCELLED | OUTPATIENT
Start: 2023-10-18

## 2023-10-18 NOTE — PROGRESS NOTES
Chief Complaint   Patient presents with    Annual Exam    Follow-up     Hypertension, Hyperlipidemia, Hypothyroidism and labs      She presents for annual physical exam and follow-up on hypertension and hyperlipidemia. She is exercising and is adherent to a low-salt diet. Patient denies chest pain, dyspnea, exertional chest pressure/discomfort, near-syncope, orthopnea, palpitations, paroxysmal nocturnal dyspnea, and syncope. Taking her medication regularly with no side effects.    She presents for follow up of hypothyroidism. Current symptoms: none. Patient  has no change in energy level, diarrhea, heat / cold intolerance, nervousness, palpitations, and weight changes.      Patient Active Problem List   Diagnosis    Acquired hypothyroidism    Allergic rhinitis    Chronic cough    Chronic cystitis    Chronic pain of right wrist    Essential hypertension    Mixed hyperlipidemia    Osteopenia    Osteoarthritis    Polyarthritis    Reactive airway disease    Rheumatoid arthritis involving multiple sites with positive rheumatoid factor (CMS/[facility])    Tenosynovitis, de Quervain    Vitamin D deficiency    Xerosis cutis    Class 2 severe obesity due to excess calories with serious comorbidity and body mass index (BMI) of 36.0 to 36.9 in adult (CMS/[facility])    Bilateral leg edema    Annual physical exam       has a current medication list which includes the following prescription(s): albuterol, ammonium lactate, biotin, prevalite, cranberry extract, furosemide, krill-omega-3-dha-epa-lipids, losartan-hydrochlorothiazide, potassium chloride cr, and levothyroxine.    Past Medical History:   Diagnosis Date    Xerosis of skin 01/10/2022       Past Surgical History:   Procedure Laterality Date    OTHER SURGICAL HISTORY  09/10/2020    Hysterectomy    OTHER SURGICAL HISTORY  09/10/2020    Knee surgery    OTHER SURGICAL HISTORY  09/10/2020    Cholecystectomy    OTHER SURGICAL HISTORY  09/10/2020    Lumpectomy       family history is not on  file.    Social History     Socioeconomic History    Marital status:      Spouse name: Not on file    Number of children: Not on file    Years of education: Not on file    Highest education level: Not on file   Occupational History    Not on file   Tobacco Use    Smoking status: Never     Passive exposure: Never    Smokeless tobacco: Never   Substance and Sexual Activity    Alcohol use: Yes    Drug use: Never    Sexual activity: Not on file   Other Topics Concern    Not on file   Social History Narrative    Not on file     Social Determinants of Health     Financial Resource Strain: Not on file   Food Insecurity: Not on file   Transportation Needs: Not on file   Physical Activity: Not on file   Stress: Not on file   Social Connections: Not on file   Intimate Partner Violence: Not on file   Housing Stability: Not on file       No Known Allergies    Review of Systems -   General ROS: negative for - fatigue, fever, malaise, night sweats or weight loss  Eyes ROS no blurred vision, no double vision, no eye discharge and no eye redness.  ENT ROS: negative for - hearing change, nasal discharge, oral lesions, sinus pain, sore throat, tinnitus or vertigo  Respiratory ROS: negative for - cough, hemoptysis, pleuritic pain, shortness of breath or wheezing  Cardiovascular ROS: no chest pain or dyspnea on exertion, palpitations, PND or orthopnea.  No syncope.  Gastrointestinal ROS: no abdominal pain, change in bowel habits, or black or bloody stools  Genito-Urinary ROS: no dysuria, trouble voiding, or hematuria  Dermatological ROS: negative for - dry skin, lumps, pruritus or rash  Musculoskeletal ROS: negative for - joint pain, joint stiffness, joint swelling, muscle pain or muscular weakness  Neurological ROS: negative for - dizziness, gait disturbance, headaches, impaired coordination/balance, numbness/tingling, tremors or visual changes  Psychological ROS: negative for - anxiety, concentration difficulties, depression,  "memory difficulties or sleep disturbances  Endocrine ROS: negative for - hot flashes, malaise/lethargy, palpitations, polydipsia/polyuria, skin changes, temperature intolerance   Hematological and Lymphatic ROS: negative for - bruising, night sweats or pallor    Blood pressure 130/80, pulse 74, temperature 36.2 °C (97.1 °F), resp. rate 16, height 1.651 m (5' 5\"), weight 98.9 kg (218 lb).    Physical Examination:   General appearance - alert, well appearing, and in no distress  HEENT EOMI, PEERLA, normal eyelids.  Oropharynx no erythema or exudate.  Normal tonsils.    Ears -external auditory canal normal bilaterally.  Tympanic membranes normal bilaterally.  Mouth - mucous membranes moist, pharynx normal without lesions  Neck - supple, trachea central no thyromegaly.  No significant adenopathy  Chest -good air entry bilaterally, no rhonchi rales and no wheezes.  Heart -Normal S1 and S2, regular rate and rhythm with no murmurs gallop or rub.  Abdomen -Non distended, soft, nontender with no guarding, no palpable mass and no CVA tenderness.  Bowel sounds normal.  No hernia.  Skin no rash, nonicteric and warm to touch.  Neurological - alert, oriented, cranial nerves II through XII normal.  Reflexes 2+ bilaterally.  No focal deficit.  Musculoskeletal - no joint tenderness, deformity or swelling  Extremities: peripheral pulses normal, no clubbing or cyanosis.    Problem List Items Addressed This Visit       Acquired hypothyroidism    Relevant Medications    levothyroxine (Synthroid, Levoxyl) 75 mcg tablet    Other Relevant Orders    Follow Up In [facility] - PCP - Established    Thyroid Stimulating Hormone    Thyroxine, Free    Annual physical exam - Primary    Class 2 severe obesity due to excess calories with serious comorbidity and body mass index (BMI) of 36.0 to 36.9 in adult (CMS/[facility])    Current Assessment & Plan     Continue decrease calorie diet and not more than 1500 calorie per day diet and low-fat " "diet.  Continue with regular exercise program.  We advised exercise at least 5 days a week for at least 45 minutes and also a minimum of 10,000 steps a day.  The detrimental effects of obesity on health were discussed.           Essential hypertension    Current Assessment & Plan     Well-controlled, continue current medications and management.  Dietary sodium restriction.  Regular aerobic exercise program is recommended to help achieve and maintain normal body weight, fitness and improve lipid balance. .  Dietary changes: Increase soluble fiber  Plant sterols 2grams per day (e.g. Benecol)  Reduce saturated fat, \"trans\" monounsaturated fatty acids, and cholesterol           Relevant Orders    Follow Up In [facility] - PCP - Established    Comprehensive Metabolic Panel    Lipid Panel    Mixed hyperlipidemia    Current Assessment & Plan     Well-controlled, continue current medications and management.           Relevant Orders    Follow Up In [facility] - PCP - Established    Comprehensive Metabolic Panel    Lipid Panel     Other Visit Diagnoses       Need for influenza vaccination        Relevant Orders    Flu vaccine, quadrivalent, high-dose, preservative free, age 65y+ (FLUZONE)             Outpatient Encounter Medications as of 10/18/2023   Medication Sig Dispense Refill    albuterol 90 mcg/actuation inhaler Inhale 2 puffs every 6 hours if needed for shortness of breath.      ammonium lactate (Lac-Hydrin) 12 % lotion Apply topically if needed for dry skin. 225 g 3    biotin 10 mg tablet Take 1 tablet (10 mg) by mouth once daily.      cholestyramine-aspartame (Prevalite) 4 gram powder MIX 1 SCOOP WITH LIQUID AND  DRINK BY MOUTH ONCE DAILY 693 g 2    cranberry extract 200 mg capsule Take 1 capsule by mouth in the morning and 1 capsule before bedtime.      furosemide (Lasix) 20 mg tablet Take 1 tablet (20 mg) by mouth once daily. 30 tablet 1    krill-omega-3-dha-epa-lipids 265-54-97-50 mg " capsule Take 1 capsule by mouth once daily.      losartan-hydrochlorothiazide (Hyzaar) 100-25 mg tablet Take 1 tablet by mouth once daily. 90 tablet 1    potassium chloride CR (Klor-Con) 10 mEq ER tablet Take 1 tablet (10 mEq) by mouth once daily. Do not crush, chew, or split. 30 tablet 1    [DISCONTINUED] levothyroxine (Synthroid, Levoxyl) 75 mcg tablet Take 1 tablet (75 mcg) by mouth once daily. 90 tablet 1    levothyroxine (Synthroid, Levoxyl) 75 mcg tablet Take 1 tablet (75 mcg) by mouth once daily. 90 tablet 1     No facility-administered encounter medications on file as of 10/18/2023.       Scribe Attestation  By signing my name below, I, Carolina Moffett   attest that this documentation has been prepared under the direction and in the presence of Ismael Gan MD.

## 2023-10-18 NOTE — PATIENT INSTRUCTIONS
BMI was above normal measurement. Current weight: 98.9 kg (218 lb)  Weight change since last visit (-) denotes wt loss -4 lbs   Weight loss needed to achieve BMI 25: 68.1 Lbs  Weight loss needed to achieve BMI 30: 38.1 Lbs  Advised to Increase physical activity.

## 2023-10-23 ENCOUNTER — APPOINTMENT (OUTPATIENT)
Dept: PRIMARY CARE | Facility: CLINIC | Age: 83
End: 2023-10-23
Payer: MEDICARE

## 2024-01-21 ENCOUNTER — APPOINTMENT (OUTPATIENT)
Dept: RADIOLOGY | Facility: HOSPITAL | Age: 84
DRG: 641 | End: 2024-01-21
Payer: MEDICARE

## 2024-01-21 ENCOUNTER — HOSPITAL ENCOUNTER (INPATIENT)
Facility: HOSPITAL | Age: 84
LOS: 2 days | Discharge: HOME | DRG: 641 | End: 2024-01-23
Attending: EMERGENCY MEDICINE | Admitting: STUDENT IN AN ORGANIZED HEALTH CARE EDUCATION/TRAINING PROGRAM
Payer: MEDICARE

## 2024-01-21 ENCOUNTER — APPOINTMENT (OUTPATIENT)
Dept: CARDIOLOGY | Facility: HOSPITAL | Age: 84
DRG: 641 | End: 2024-01-21
Payer: MEDICARE

## 2024-01-21 DIAGNOSIS — R55 SYNCOPE AND COLLAPSE: ICD-10-CM

## 2024-01-21 DIAGNOSIS — J10.1 INFLUENZA A: ICD-10-CM

## 2024-01-21 DIAGNOSIS — R42 VERTIGO: Primary | ICD-10-CM

## 2024-01-21 DIAGNOSIS — N30.00 ACUTE CYSTITIS WITHOUT HEMATURIA: ICD-10-CM

## 2024-01-21 DIAGNOSIS — R05.3 CHRONIC COUGH: ICD-10-CM

## 2024-01-21 LAB
ALBUMIN SERPL BCP-MCNC: 3.7 G/DL (ref 3.4–5)
ALP SERPL-CCNC: 46 U/L (ref 33–136)
ALT SERPL W P-5'-P-CCNC: 13 U/L (ref 7–45)
ANION GAP SERPL CALC-SCNC: 15 MMOL/L (ref 10–20)
APPEARANCE UR: ABNORMAL
AST SERPL W P-5'-P-CCNC: 16 U/L (ref 9–39)
BACTERIA #/AREA URNS AUTO: ABNORMAL /HPF
BASOPHILS # BLD AUTO: 0.02 X10*3/UL (ref 0–0.1)
BASOPHILS NFR BLD AUTO: 0.3 %
BILIRUB SERPL-MCNC: 0.6 MG/DL (ref 0–1.2)
BILIRUB UR STRIP.AUTO-MCNC: NEGATIVE MG/DL
BUN SERPL-MCNC: 26 MG/DL (ref 6–23)
CALCIUM SERPL-MCNC: 8.6 MG/DL (ref 8.6–10.3)
CARDIAC TROPONIN I PNL SERPL HS: 10 NG/L (ref 0–13)
CHLORIDE SERPL-SCNC: 103 MMOL/L (ref 98–107)
CO2 SERPL-SCNC: 21 MMOL/L (ref 21–32)
COLOR UR: YELLOW
CREAT SERPL-MCNC: 1.16 MG/DL (ref 0.5–1.05)
D DIMER PPP FEU-MCNC: 1406 NG/ML FEU
EGFRCR SERPLBLD CKD-EPI 2021: 47 ML/MIN/1.73M*2
EOSINOPHIL # BLD AUTO: 0.05 X10*3/UL (ref 0–0.4)
EOSINOPHIL NFR BLD AUTO: 0.8 %
ERYTHROCYTE [DISTWIDTH] IN BLOOD BY AUTOMATED COUNT: 14.4 % (ref 11.5–14.5)
FLUAV RNA RESP QL NAA+PROBE: DETECTED
FLUBV RNA RESP QL NAA+PROBE: NOT DETECTED
GLUCOSE SERPL-MCNC: 85 MG/DL (ref 74–99)
GLUCOSE UR STRIP.AUTO-MCNC: NEGATIVE MG/DL
HCT VFR BLD AUTO: 42.4 % (ref 36–46)
HGB BLD-MCNC: 13.8 G/DL (ref 12–16)
HYALINE CASTS #/AREA URNS AUTO: ABNORMAL /LPF
IMM GRANULOCYTES # BLD AUTO: 0.01 X10*3/UL (ref 0–0.5)
IMM GRANULOCYTES NFR BLD AUTO: 0.2 % (ref 0–0.9)
INR PPP: 1.1 (ref 0.9–1.1)
KETONES UR STRIP.AUTO-MCNC: ABNORMAL MG/DL
LACTATE SERPL-SCNC: 0.9 MMOL/L (ref 0.4–2)
LEUKOCYTE ESTERASE UR QL STRIP.AUTO: ABNORMAL
LYMPHOCYTES # BLD AUTO: 1.92 X10*3/UL (ref 0.8–3)
LYMPHOCYTES NFR BLD AUTO: 31 %
MAGNESIUM SERPL-MCNC: 1.97 MG/DL (ref 1.6–2.4)
MCH RBC QN AUTO: 29.1 PG (ref 26–34)
MCHC RBC AUTO-ENTMCNC: 32.5 G/DL (ref 32–36)
MCV RBC AUTO: 90 FL (ref 80–100)
MONOCYTES # BLD AUTO: 0.9 X10*3/UL (ref 0.05–0.8)
MONOCYTES NFR BLD AUTO: 14.5 %
MUCOUS THREADS #/AREA URNS AUTO: ABNORMAL /LPF
NEUTROPHILS # BLD AUTO: 3.29 X10*3/UL (ref 1.6–5.5)
NEUTROPHILS NFR BLD AUTO: 53.2 %
NITRITE UR QL STRIP.AUTO: POSITIVE
NRBC BLD-RTO: 0 /100 WBCS (ref 0–0)
PH UR STRIP.AUTO: 5 [PH]
PLATELET # BLD AUTO: 205 X10*3/UL (ref 150–450)
POTASSIUM SERPL-SCNC: 3.6 MMOL/L (ref 3.5–5.3)
PROT SERPL-MCNC: 6.6 G/DL (ref 6.4–8.2)
PROT UR STRIP.AUTO-MCNC: NEGATIVE MG/DL
PROTHROMBIN TIME: 12.3 SECONDS (ref 9.8–12.8)
RBC # BLD AUTO: 4.74 X10*6/UL (ref 4–5.2)
RBC # UR STRIP.AUTO: NEGATIVE /UL
RBC #/AREA URNS AUTO: ABNORMAL /HPF
SARS-COV-2 RNA RESP QL NAA+PROBE: NOT DETECTED
SODIUM SERPL-SCNC: 135 MMOL/L (ref 136–145)
SP GR UR STRIP.AUTO: 1.02
SQUAMOUS #/AREA URNS AUTO: ABNORMAL /HPF
UROBILINOGEN UR STRIP.AUTO-MCNC: <2 MG/DL
WBC # BLD AUTO: 6.2 X10*3/UL (ref 4.4–11.3)
WBC #/AREA URNS AUTO: ABNORMAL /HPF

## 2024-01-21 PROCEDURE — 87186 SC STD MICRODIL/AGAR DIL: CPT | Mod: ELYLAB | Performed by: PHYSICIAN ASSISTANT

## 2024-01-21 PROCEDURE — 81001 URINALYSIS AUTO W/SCOPE: CPT | Performed by: PHYSICIAN ASSISTANT

## 2024-01-21 PROCEDURE — 85379 FIBRIN DEGRADATION QUANT: CPT | Performed by: PHYSICIAN ASSISTANT

## 2024-01-21 PROCEDURE — 96365 THER/PROPH/DIAG IV INF INIT: CPT

## 2024-01-21 PROCEDURE — 87636 SARSCOV2 & INF A&B AMP PRB: CPT | Performed by: PHYSICIAN ASSISTANT

## 2024-01-21 PROCEDURE — 99285 EMERGENCY DEPT VISIT HI MDM: CPT | Performed by: EMERGENCY MEDICINE

## 2024-01-21 PROCEDURE — 2500000004 HC RX 250 GENERAL PHARMACY W/ HCPCS (ALT 636 FOR OP/ED): Performed by: PHYSICIAN ASSISTANT

## 2024-01-21 PROCEDURE — 71275 CT ANGIOGRAPHY CHEST: CPT

## 2024-01-21 PROCEDURE — 87040 BLOOD CULTURE FOR BACTERIA: CPT | Mod: ELYLAB | Performed by: PHYSICIAN ASSISTANT

## 2024-01-21 PROCEDURE — 80053 COMPREHEN METABOLIC PANEL: CPT | Performed by: PHYSICIAN ASSISTANT

## 2024-01-21 PROCEDURE — 84484 ASSAY OF TROPONIN QUANT: CPT | Performed by: PHYSICIAN ASSISTANT

## 2024-01-21 PROCEDURE — 74176 CT ABD & PELVIS W/O CONTRAST: CPT

## 2024-01-21 PROCEDURE — 36415 COLL VENOUS BLD VENIPUNCTURE: CPT | Performed by: PHYSICIAN ASSISTANT

## 2024-01-21 PROCEDURE — 85610 PROTHROMBIN TIME: CPT | Performed by: PHYSICIAN ASSISTANT

## 2024-01-21 PROCEDURE — 1200000002 HC GENERAL ROOM WITH TELEMETRY DAILY

## 2024-01-21 PROCEDURE — 71045 X-RAY EXAM CHEST 1 VIEW: CPT

## 2024-01-21 PROCEDURE — 71275 CT ANGIOGRAPHY CHEST: CPT | Performed by: SURGERY

## 2024-01-21 PROCEDURE — 72125 CT NECK SPINE W/O DYE: CPT | Performed by: RADIOLOGY

## 2024-01-21 PROCEDURE — 83605 ASSAY OF LACTIC ACID: CPT | Performed by: PHYSICIAN ASSISTANT

## 2024-01-21 PROCEDURE — 74176 CT ABD & PELVIS W/O CONTRAST: CPT | Performed by: RADIOLOGY

## 2024-01-21 PROCEDURE — 96361 HYDRATE IV INFUSION ADD-ON: CPT

## 2024-01-21 PROCEDURE — 70450 CT HEAD/BRAIN W/O DYE: CPT | Performed by: RADIOLOGY

## 2024-01-21 PROCEDURE — 85025 COMPLETE CBC W/AUTO DIFF WBC: CPT | Performed by: PHYSICIAN ASSISTANT

## 2024-01-21 PROCEDURE — 93005 ELECTROCARDIOGRAM TRACING: CPT

## 2024-01-21 PROCEDURE — 2500000001 HC RX 250 WO HCPCS SELF ADMINISTERED DRUGS (ALT 637 FOR MEDICARE OP): Performed by: PHYSICIAN ASSISTANT

## 2024-01-21 PROCEDURE — 70450 CT HEAD/BRAIN W/O DYE: CPT

## 2024-01-21 PROCEDURE — 83735 ASSAY OF MAGNESIUM: CPT | Performed by: PHYSICIAN ASSISTANT

## 2024-01-21 PROCEDURE — 99222 1ST HOSP IP/OBS MODERATE 55: CPT | Performed by: STUDENT IN AN ORGANIZED HEALTH CARE EDUCATION/TRAINING PROGRAM

## 2024-01-21 PROCEDURE — 72125 CT NECK SPINE W/O DYE: CPT

## 2024-01-21 PROCEDURE — 2550000001 HC RX 255 CONTRASTS: Performed by: EMERGENCY MEDICINE

## 2024-01-21 PROCEDURE — 71045 X-RAY EXAM CHEST 1 VIEW: CPT | Performed by: STUDENT IN AN ORGANIZED HEALTH CARE EDUCATION/TRAINING PROGRAM

## 2024-01-21 RX ORDER — IPRATROPIUM BROMIDE AND ALBUTEROL SULFATE 2.5; .5 MG/3ML; MG/3ML
3 SOLUTION RESPIRATORY (INHALATION)
Status: DISCONTINUED | OUTPATIENT
Start: 2024-01-21 | End: 2024-01-22

## 2024-01-21 RX ORDER — GUAIFENESIN/DEXTROMETHORPHAN 100-10MG/5
5 SYRUP ORAL EVERY 4 HOURS PRN
Status: DISCONTINUED | OUTPATIENT
Start: 2024-01-21 | End: 2024-01-23 | Stop reason: HOSPADM

## 2024-01-21 RX ORDER — MECLIZINE HYDROCHLORIDE 25 MG/1
25 TABLET ORAL 3 TIMES DAILY PRN
Qty: 15 TABLET | Refills: 0 | Status: SHIPPED | OUTPATIENT
Start: 2024-01-21 | End: 2024-01-21

## 2024-01-21 RX ORDER — ONDANSETRON 4 MG/1
4 TABLET, FILM COATED ORAL EVERY 8 HOURS PRN
Status: DISCONTINUED | OUTPATIENT
Start: 2024-01-21 | End: 2024-01-23 | Stop reason: HOSPADM

## 2024-01-21 RX ORDER — ENOXAPARIN SODIUM 100 MG/ML
40 INJECTION SUBCUTANEOUS EVERY 24 HOURS
Status: DISCONTINUED | OUTPATIENT
Start: 2024-01-22 | End: 2024-01-23 | Stop reason: HOSPADM

## 2024-01-21 RX ORDER — PANTOPRAZOLE SODIUM 40 MG/10ML
40 INJECTION, POWDER, LYOPHILIZED, FOR SOLUTION INTRAVENOUS DAILY PRN
Status: DISCONTINUED | OUTPATIENT
Start: 2024-01-21 | End: 2024-01-23 | Stop reason: HOSPADM

## 2024-01-21 RX ORDER — TALC
3 POWDER (GRAM) TOPICAL DAILY
Status: DISCONTINUED | OUTPATIENT
Start: 2024-01-21 | End: 2024-01-23 | Stop reason: HOSPADM

## 2024-01-21 RX ORDER — SODIUM CHLORIDE 9 MG/ML
100 INJECTION, SOLUTION INTRAVENOUS CONTINUOUS
Status: DISCONTINUED | OUTPATIENT
Start: 2024-01-21 | End: 2024-01-23

## 2024-01-21 RX ORDER — CEPHALEXIN 500 MG/1
500 CAPSULE ORAL 2 TIMES DAILY
Qty: 14 CAPSULE | Refills: 0 | Status: SHIPPED | OUTPATIENT
Start: 2024-01-21 | End: 2024-01-21

## 2024-01-21 RX ORDER — GUAIFENESIN 600 MG/1
600 TABLET, EXTENDED RELEASE ORAL EVERY 12 HOURS PRN
Status: DISCONTINUED | OUTPATIENT
Start: 2024-01-21 | End: 2024-01-23 | Stop reason: HOSPADM

## 2024-01-21 RX ORDER — ACETAMINOPHEN 160 MG/5ML
650 SOLUTION ORAL EVERY 4 HOURS PRN
Status: DISCONTINUED | OUTPATIENT
Start: 2024-01-21 | End: 2024-01-23 | Stop reason: HOSPADM

## 2024-01-21 RX ORDER — MECLIZINE HCL 12.5 MG 12.5 MG/1
25 TABLET ORAL 3 TIMES DAILY PRN
Status: DISCONTINUED | OUTPATIENT
Start: 2024-01-21 | End: 2024-01-23 | Stop reason: HOSPADM

## 2024-01-21 RX ORDER — ACETAMINOPHEN 325 MG/1
650 TABLET ORAL EVERY 4 HOURS PRN
Status: DISCONTINUED | OUTPATIENT
Start: 2024-01-21 | End: 2024-01-23 | Stop reason: HOSPADM

## 2024-01-21 RX ORDER — ACETAMINOPHEN 650 MG/1
650 SUPPOSITORY RECTAL EVERY 4 HOURS PRN
Status: DISCONTINUED | OUTPATIENT
Start: 2024-01-21 | End: 2024-01-23 | Stop reason: HOSPADM

## 2024-01-21 RX ORDER — CEFTRIAXONE 1 G/50ML
1 INJECTION, SOLUTION INTRAVENOUS DAILY
Status: DISCONTINUED | OUTPATIENT
Start: 2024-01-22 | End: 2024-01-23 | Stop reason: HOSPADM

## 2024-01-21 RX ORDER — IPRATROPIUM BROMIDE AND ALBUTEROL SULFATE 2.5; .5 MG/3ML; MG/3ML
3 SOLUTION RESPIRATORY (INHALATION) EVERY 2 HOUR PRN
Status: DISCONTINUED | OUTPATIENT
Start: 2024-01-21 | End: 2024-01-23 | Stop reason: HOSPADM

## 2024-01-21 RX ORDER — MECLIZINE HCL 12.5 MG 12.5 MG/1
25 TABLET ORAL ONCE
Status: COMPLETED | OUTPATIENT
Start: 2024-01-21 | End: 2024-01-21

## 2024-01-21 RX ORDER — ONDANSETRON HYDROCHLORIDE 2 MG/ML
4 INJECTION, SOLUTION INTRAVENOUS EVERY 8 HOURS PRN
Status: DISCONTINUED | OUTPATIENT
Start: 2024-01-21 | End: 2024-01-23 | Stop reason: HOSPADM

## 2024-01-21 RX ORDER — BISACODYL 5 MG
10 TABLET, DELAYED RELEASE (ENTERIC COATED) ORAL DAILY PRN
Status: DISCONTINUED | OUTPATIENT
Start: 2024-01-21 | End: 2024-01-23 | Stop reason: HOSPADM

## 2024-01-21 RX ORDER — PANTOPRAZOLE SODIUM 40 MG/1
40 TABLET, DELAYED RELEASE ORAL DAILY PRN
Status: DISCONTINUED | OUTPATIENT
Start: 2024-01-21 | End: 2024-01-23 | Stop reason: HOSPADM

## 2024-01-21 RX ADMIN — SODIUM CHLORIDE 500 ML: 9 INJECTION, SOLUTION INTRAVENOUS at 18:17

## 2024-01-21 RX ADMIN — TAZOBACTAM SODIUM AND PIPERACILLIN SODIUM 3.38 G: 375; 3 INJECTION, SOLUTION INTRAVENOUS at 20:20

## 2024-01-21 RX ADMIN — IOHEXOL 75 ML: 350 INJECTION, SOLUTION INTRAVENOUS at 17:59

## 2024-01-21 RX ADMIN — MECLIZINE HCL 12.5 MG 25 MG: 12.5 TABLET ORAL at 16:13

## 2024-01-21 SDOH — SOCIAL STABILITY: SOCIAL INSECURITY
WITHIN THE LAST YEAR, HAVE YOU BEEN KICKED, HIT, SLAPPED, OR OTHERWISE PHYSICALLY HURT BY YOUR PARTNER OR EX-PARTNER?: NO

## 2024-01-21 SDOH — ECONOMIC STABILITY: INCOME INSECURITY: IN THE PAST 12 MONTHS, HAS THE ELECTRIC, GAS, OIL, OR WATER COMPANY THREATENED TO SHUT OFF SERVICE IN YOUR HOME?: NO

## 2024-01-21 SDOH — SOCIAL STABILITY: SOCIAL INSECURITY
WITHIN THE LAST YEAR, HAVE TO BEEN RAPED OR FORCED TO HAVE ANY KIND OF SEXUAL ACTIVITY BY YOUR PARTNER OR EX-PARTNER?: NO

## 2024-01-21 SDOH — SOCIAL STABILITY: SOCIAL INSECURITY: WERE YOU ABLE TO COMPLETE ALL THE BEHAVIORAL HEALTH SCREENINGS?: YES

## 2024-01-21 SDOH — ECONOMIC STABILITY: FOOD INSECURITY: WITHIN THE PAST 12 MONTHS, YOU WORRIED THAT YOUR FOOD WOULD RUN OUT BEFORE YOU GOT MONEY TO BUY MORE.: NEVER TRUE

## 2024-01-21 SDOH — SOCIAL STABILITY: SOCIAL NETWORK: HOW OFTEN DO YOU ATTENT MEETINGS OF THE CLUB OR ORGANIZATION YOU BELONG TO?: NEVER

## 2024-01-21 SDOH — SOCIAL STABILITY: SOCIAL INSECURITY: WITHIN THE LAST YEAR, HAVE YOU BEEN AFRAID OF YOUR PARTNER OR EX-PARTNER?: NO

## 2024-01-21 SDOH — HEALTH STABILITY: MENTAL HEALTH
STRESS IS WHEN SOMEONE FEELS TENSE, NERVOUS, ANXIOUS, OR CAN'T SLEEP AT NIGHT BECAUSE THEIR MIND IS TROUBLED. HOW STRESSED ARE YOU?: NOT AT ALL

## 2024-01-21 SDOH — SOCIAL STABILITY: SOCIAL INSECURITY: WITHIN THE LAST YEAR, HAVE YOU BEEN HUMILIATED OR EMOTIONALLY ABUSED IN OTHER WAYS BY YOUR PARTNER OR EX-PARTNER?: NO

## 2024-01-21 SDOH — SOCIAL STABILITY: SOCIAL NETWORK: HOW OFTEN DO YOU ATTEND CHURCH OR RELIGIOUS SERVICES?: NEVER

## 2024-01-21 SDOH — SOCIAL STABILITY: SOCIAL INSECURITY: ARE THERE ANY APPARENT SIGNS OF INJURIES/BEHAVIORS THAT COULD BE RELATED TO ABUSE/NEGLECT?: NO

## 2024-01-21 SDOH — SOCIAL STABILITY: SOCIAL INSECURITY: HAS ANYONE EVER THREATENED TO HURT YOUR FAMILY OR YOUR PETS?: NO

## 2024-01-21 SDOH — SOCIAL STABILITY: SOCIAL NETWORK: HOW OFTEN DO YOU GET TOGETHER WITH FRIENDS OR RELATIVES?: TWICE A WEEK

## 2024-01-21 SDOH — SOCIAL STABILITY: SOCIAL INSECURITY: ARE YOU OR HAVE YOU BEEN THREATENED OR ABUSED PHYSICALLY, EMOTIONALLY, OR SEXUALLY BY ANYONE?: NO

## 2024-01-21 SDOH — SOCIAL STABILITY: SOCIAL INSECURITY: ABUSE: ADULT

## 2024-01-21 SDOH — SOCIAL STABILITY: SOCIAL INSECURITY: POSSIBLE ABUSE REPORTED TO:: OTHER (COMMENT)

## 2024-01-21 SDOH — ECONOMIC STABILITY: FOOD INSECURITY: WITHIN THE PAST 12 MONTHS, THE FOOD YOU BOUGHT JUST DIDN'T LAST AND YOU DIDN'T HAVE MONEY TO GET MORE.: NEVER TRUE

## 2024-01-21 SDOH — SOCIAL STABILITY: SOCIAL INSECURITY: DOES ANYONE TRY TO KEEP YOU FROM HAVING/CONTACTING OTHER FRIENDS OR DOING THINGS OUTSIDE YOUR HOME?: NO

## 2024-01-21 SDOH — SOCIAL STABILITY: SOCIAL INSECURITY: HAVE YOU HAD THOUGHTS OF HARMING ANYONE ELSE?: NO

## 2024-01-21 SDOH — SOCIAL STABILITY: SOCIAL NETWORK
IN A TYPICAL WEEK, HOW MANY TIMES DO YOU TALK ON THE PHONE WITH FAMILY, FRIENDS, OR NEIGHBORS?: MORE THAN THREE TIMES A WEEK

## 2024-01-21 SDOH — HEALTH STABILITY: MENTAL HEALTH: EXPERIENCED ANY OF THE FOLLOWING LIFE EVENTS: DEATH OF FAMILY/FRIEND

## 2024-01-21 SDOH — SOCIAL STABILITY: SOCIAL NETWORK
DO YOU BELONG TO ANY CLUBS OR ORGANIZATIONS SUCH AS CHURCH GROUPS UNIONS, FRATERNAL OR ATHLETIC GROUPS, OR SCHOOL GROUPS?: NO

## 2024-01-21 SDOH — SOCIAL STABILITY: SOCIAL NETWORK: ARE YOU MARRIED, WIDOWED, DIVORCED, SEPARATED, NEVER MARRIED, OR LIVING WITH A PARTNER?: MARRIED

## 2024-01-21 SDOH — HEALTH STABILITY: PHYSICAL HEALTH: ON AVERAGE, HOW MANY MINUTES DO YOU ENGAGE IN EXERCISE AT THIS LEVEL?: 0 MIN

## 2024-01-21 SDOH — SOCIAL STABILITY: SOCIAL INSECURITY: DO YOU FEEL ANYONE HAS EXPLOITED OR TAKEN ADVANTAGE OF YOU FINANCIALLY OR OF YOUR PERSONAL PROPERTY?: NO

## 2024-01-21 SDOH — SOCIAL STABILITY: SOCIAL INSECURITY: DO YOU FEEL UNSAFE GOING BACK TO THE PLACE WHERE YOU ARE LIVING?: NO

## 2024-01-21 SDOH — HEALTH STABILITY: PHYSICAL HEALTH: ON AVERAGE, HOW MANY DAYS PER WEEK DO YOU ENGAGE IN MODERATE TO STRENUOUS EXERCISE (LIKE A BRISK WALK)?: 0 DAYS

## 2024-01-21 SDOH — ECONOMIC STABILITY: INCOME INSECURITY: HOW HARD IS IT FOR YOU TO PAY FOR THE VERY BASICS LIKE FOOD, HOUSING, MEDICAL CARE, AND HEATING?: NOT HARD AT ALL

## 2024-01-21 ASSESSMENT — COGNITIVE AND FUNCTIONAL STATUS - GENERAL
MOBILITY SCORE: 24
DAILY ACTIVITIY SCORE: 24
PATIENT BASELINE BEDBOUND: NO

## 2024-01-21 ASSESSMENT — PAIN SCALES - GENERAL
PAINLEVEL_OUTOF10: 0 - NO PAIN
PAINLEVEL_OUTOF10: 8

## 2024-01-21 ASSESSMENT — LIFESTYLE VARIABLES
HOW MANY STANDARD DRINKS CONTAINING ALCOHOL DO YOU HAVE ON A TYPICAL DAY: 1 OR 2
AUDIT-C TOTAL SCORE: 2
HOW OFTEN DO YOU HAVE A DRINK CONTAINING ALCOHOL: 2-4 TIMES A MONTH
PRESCIPTION_ABUSE_PAST_12_MONTHS: NO
HOW OFTEN DO YOU HAVE 6 OR MORE DRINKS ON ONE OCCASION: NEVER
EVER HAD A DRINK FIRST THING IN THE MORNING TO STEADY YOUR NERVES TO GET RID OF A HANGOVER: NO
SUBSTANCE_ABUSE_PAST_12_MONTHS: NO
HAVE PEOPLE ANNOYED YOU BY CRITICIZING YOUR DRINKING: NO
SKIP TO QUESTIONS 9-10: 1
HAVE YOU EVER FELT YOU SHOULD CUT DOWN ON YOUR DRINKING: NO
REASON UNABLE TO ASSESS: NO
AUDIT-C TOTAL SCORE: 2
EVER FELT BAD OR GUILTY ABOUT YOUR DRINKING: NO

## 2024-01-21 ASSESSMENT — PATIENT HEALTH QUESTIONNAIRE - PHQ9
2. FEELING DOWN, DEPRESSED OR HOPELESS: NOT AT ALL
1. LITTLE INTEREST OR PLEASURE IN DOING THINGS: NOT AT ALL
SUM OF ALL RESPONSES TO PHQ9 QUESTIONS 1 & 2: 0

## 2024-01-21 ASSESSMENT — ACTIVITIES OF DAILY LIVING (ADL)
PATIENT'S MEMORY ADEQUATE TO SAFELY COMPLETE DAILY ACTIVITIES?: YES
HEARING - RIGHT EAR: DIFFICULTY WITH NOISE
WALKS IN HOME: INDEPENDENT
TOILETING: INDEPENDENT
DRESSING YOURSELF: INDEPENDENT
LACK_OF_TRANSPORTATION: NO
FEEDING YOURSELF: INDEPENDENT
JUDGMENT_ADEQUATE_SAFELY_COMPLETE_DAILY_ACTIVITIES: YES
GROOMING: INDEPENDENT
ASSISTIVE_DEVICE: DENTURES PARTIAL
ADEQUATE_TO_COMPLETE_ADL: YES
HEARING - LEFT EAR: FUNCTIONAL
BATHING: INDEPENDENT

## 2024-01-21 ASSESSMENT — COLUMBIA-SUICIDE SEVERITY RATING SCALE - C-SSRS
2. HAVE YOU ACTUALLY HAD ANY THOUGHTS OF KILLING YOURSELF?: NO
1. IN THE PAST MONTH, HAVE YOU WISHED YOU WERE DEAD OR WISHED YOU COULD GO TO SLEEP AND NOT WAKE UP?: NO
1. IN THE PAST MONTH, HAVE YOU WISHED YOU WERE DEAD OR WISHED YOU COULD GO TO SLEEP AND NOT WAKE UP?: NO
6. HAVE YOU EVER DONE ANYTHING, STARTED TO DO ANYTHING, OR PREPARED TO DO ANYTHING TO END YOUR LIFE?: NO
6. HAVE YOU EVER DONE ANYTHING, STARTED TO DO ANYTHING, OR PREPARED TO DO ANYTHING TO END YOUR LIFE?: NO
2. HAVE YOU ACTUALLY HAD ANY THOUGHTS OF KILLING YOURSELF?: NO

## 2024-01-21 ASSESSMENT — PAIN - FUNCTIONAL ASSESSMENT
PAIN_FUNCTIONAL_ASSESSMENT: 0-10
PAIN_FUNCTIONAL_ASSESSMENT: 0-10

## 2024-01-21 NOTE — ED PROVIDER NOTES
HPI   Chief Complaint   Patient presents with    Dizziness     Since Friday.  C/o audrey. Knee pain from syncopal episode on Friday, denies head/neck injury, denies LOC, denies thinners         History provided by:  Patient and spouse   used: No         83-year-old female past medical history hypertension, hyperlipidemia and RA presents for intermittent episodes of a room spinning sensation for the past 2 days.  She states that she was making food at the kitchen counter and felt like the room was spinning and passed out.  She woke up to the dog licking her.  She does not believe she hit her head.  She is complaining of blurry vision in both eyes.  She states that she had another episode today.  She is not dizzy right now.  She has also been coughing.  She states that she feels short of breath for over a week now.  She has been ambulating without any issues since the incident.  Denies fever, headache, weakness, n/v/d, abd or back pain, dysuria, CP    ROS negative unless otherwise stated in HPI                   Jyoti Coma Scale Score: 15                  Patient History   Past Medical History:   Diagnosis Date    Xerosis of skin 01/10/2022     Past Surgical History:   Procedure Laterality Date    OTHER SURGICAL HISTORY  09/10/2020    Hysterectomy    OTHER SURGICAL HISTORY  09/10/2020    Knee surgery    OTHER SURGICAL HISTORY  09/10/2020    Cholecystectomy    OTHER SURGICAL HISTORY  09/10/2020    Lumpectomy     No family history on file.  Social History     Tobacco Use    Smoking status: Never     Passive exposure: Never    Smokeless tobacco: Never   Vaping Use    Vaping Use: Never used   Substance Use Topics    Alcohol use: Yes    Drug use: Never       Physical Exam   ED Triage Vitals [01/21/24 1507]   Temp Heart Rate Respirations BP   37 °C (98.6 °F) 78 16 111/61      Pulse Ox Temp Source Heart Rate Source Patient Position   98 % Temporal -- Lying      BP Location FiO2 (%)     Left arm --        Physical Exam    General: alert, no distress, well nourished, talking in full and complete sentences  Skin: dry, intact, no rashes  Head: atraumatic  Eyes: EOMI, PERRLA, normal conjunctiva  Throat: no stridor, no hot potato voice  Nose: nares patent  Mouth: mucous membranes moist  Neck: FROM without pain, NT, no vertebral tenderness  CV: +S1/S1  Respiratory: non labored breathing, lungs CTA, no wheezing, no retractions  Extremities: FROM X4, strength +5/5, pulses intact, capillary refill intact, radial pulses equal  Neuro: A&Ox3, no focal neurological deficits  Psych: cooperative, appropriate mood      ED Course & MDM   Diagnoses as of 01/21/24 2048   Vertigo   Syncope and collapse   Influenza A   Acute cystitis without hematuria     Labs Reviewed   CBC WITH AUTO DIFFERENTIAL - Abnormal       Result Value    WBC 6.2      nRBC 0.0      RBC 4.74      Hemoglobin 13.8      Hematocrit 42.4      MCV 90      MCH 29.1      MCHC 32.5      RDW 14.4      Platelets 205      Neutrophils % 53.2      Immature Granulocytes %, Automated 0.2      Lymphocytes % 31.0      Monocytes % 14.5      Eosinophils % 0.8      Basophils % 0.3      Neutrophils Absolute 3.29      Immature Granulocytes Absolute, Automated 0.01      Lymphocytes Absolute 1.92      Monocytes Absolute 0.90 (*)     Eosinophils Absolute 0.05      Basophils Absolute 0.02     COMPREHENSIVE METABOLIC PANEL - Abnormal    Glucose 85      Sodium 135 (*)     Potassium 3.6      Chloride 103      Bicarbonate 21      Anion Gap 15      Urea Nitrogen 26 (*)     Creatinine 1.16 (*)     eGFR 47 (*)     Calcium 8.6      Albumin 3.7      Alkaline Phosphatase 46      Total Protein 6.6      AST 16      Bilirubin, Total 0.6      ALT 13     D-DIMER, NON VTE - Abnormal    D-Dimer Non VTE, Quant (ng/mL FEU) 1,406 (*)     Narrative:     The D-Dimer assay is reported in ng/mL Fibrinogen Equivalent Units (FEU). The results of this assay should NOT be used for the exclusion of Deep Vein  Thrombosis and/or Pulmonary Embolism.   INFLUENZA A AND B PCR - Abnormal    Flu A Result Detected (*)     Flu B Result Not Detected      Narrative:     This assay is an in vitro diagnostic multiplex nucleic acid amplification test for the detection and discrimination of Influenza A & B from nasopharyngeal specimens, and has been validated for use at [facility]. Negative results do not preclude Influenza A/B infections, and should not be used as the sole basis for diagnosis, treatment, or other management decisions. If Influenza A/B and RSV PCR results are negative, testing for Parainfluenza virus, Adenovirus and Metapneumovirus is routinely performed for [facility] [place] and intensive care inpatients, and is available on other patients by placing an add-on request.   URINALYSIS WITH REFLEX CULTURE AND MICROSCOPIC - Abnormal    Color, Urine Yellow      Appearance, Urine Hazy (*)     Specific Gravity, Urine 1.016      pH, Urine 5.0      Protein, Urine NEGATIVE      Glucose, Urine NEGATIVE      Blood, Urine NEGATIVE      Ketones, Urine 5 (TRACE) (*)     Bilirubin, Urine NEGATIVE      Urobilinogen, Urine <2.0      Nitrite, Urine POSITIVE (*)     Leukocyte Esterase, Urine SMALL (1+) (*)    MICROSCOPIC ONLY, URINE - Abnormal    WBC, Urine 11-20 (*)     RBC, Urine 1-2      Squamous Epithelial Cells, Urine 1-9 (SPARSE)      Bacteria, Urine 4+ (*)     Mucus, Urine 2+      Hyaline Casts, Urine 3+ (*)    MAGNESIUM - Normal    Magnesium 1.97     PROTIME-INR - Normal    Protime 12.3      INR 1.1     TROPONIN I, HIGH SENSITIVITY - Normal    Troponin I, High Sensitivity 10      Narrative:     Less than 99th percentile of normal range cutoff-  Female and children under 18 years old <14 ng/L; Male <21 ng/L: Negative  Repeat testing should be performed if clinically indicated.     Female and children under 18 years old 14-50 ng/L; Male 21-50 ng/L:  Consistent with possible cardiac damage and possible  increased clinical   risk. Serial measurements may help to assess extent of myocardial damage.     >50 ng/L: Consistent with cardiac damage, increased clinical risk and  myocardial infarction. Serial measurements may help assess extent of   myocardial damage.      NOTE: Children less than 1 year old may have higher baseline troponin   levels and results should be interpreted in conjunction with the overall   clinical context.     NOTE: Troponin I testing is performed using a different   testing methodology at [facility] than at other   [facility] hospitals. Direct result comparisons should only   be made within the same method.   SARS-COV-2 PCR, SYMPTOMATIC - Normal    Coronavirus 2019, PCR Not Detected      Narrative:     This assay has received FDA Emergency Use Authorization (EUA) and is only authorized for the duration of time that circumstances exist to justify the authorization of the emergency use of in vitro diagnostic tests for the detection of SARS-CoV-2 virus and/or diagnosis of COVID-19 infection under section 564(b)(1) of the Act, 21 U.S.C. 360bbb-3(b)(1). This assay is an in vitro diagnostic nucleic acid amplification test for the qualitative detection of SARS-CoV-2 from nasopharyngeal specimens and has been validated for use at [facility]. Negative results do not preclude COVID-19 infections and should not be used as the sole basis for diagnosis, treatment, or other management decisions.     URINE CULTURE   BLOOD CULTURE   BLOOD CULTURE   URINALYSIS WITH REFLEX CULTURE AND MICROSCOPIC    Narrative:     The following orders were created for panel order Urinalysis with Reflex Culture and Microscopic.  Procedure                               Abnormality         Status                     ---------                               -----------         ------                     Urinalysis with Reflex C...[[id number]]  Abnormal            Final result               Extra Urine  Iniguez Tube[[id number]]                            In process                   Please view results for these tests on the individual orders.   EXTRA URINE GRAY TUBE   LACTATE      CT abdomen pelvis wo IV contrast   Final Result   Bilateral renal sinus cysts noted which corresponds to fullness   described on prior PE CT. Note is made of contrast within the   collecting system related to prior PE CT. Evaluation for renal,   ureteral or bladder calculi is nondiagnostic due to presence of   contrast. No evidence for obstructive uropathy.        Diffuse scattered colonic diverticula most marked in the descending   and sigmoid colon without evidence for acute diverticulitis        Additional findings as described above.        MACRO:   None        Signed by: Angela Forrest 1/21/2024 7:56 PM   Dictation workstation:   [id number]      CT angio chest for pulmonary embolism   Final Result   Limited exam secondary to motion artifact. No pulmonary embolus to   the proximal segmental level.        Respiratory motion artifact limits evaluation of lung parenchyma.   There is bibasilar reticular and ground-glass opacity, more   pronounced on the left, which could relate to atelectasis, scarring   or pneumonia. No focal consolidation. Trace pleural effusions. No   pneumothorax.        Partially imaged moderate left pelvicaliectasis with mild left   perinephric edema suggesting UPJ or more distal obstruction with or   without superimposed infection. Suggest correlation with CT abdomen   and pelvis.        Cardiomegaly.        Atherosclerosis, including coronary artery disease.        MACRO:   None        Signed by: Brett Fernando 1/21/2024 6:41 PM   Dictation workstation:   [id number]      XR chest 1 view   Final Result   1.  No evidence of acute cardiopulmonary process.             Signed by: Derrek Trevino 1/21/2024 6:03 PM   Dictation workstation:   [id number]      CT head wo IV contrast   Final Result   CT HEAD:   1. No acute  intracranial abnormality or calvarial fracture.             CT CERVICAL SPINE:   1. No acute fracture or traumatic malalignment of the cervical spine.        MACRO:   None        Signed by: Kurtis Webb 1/21/2024 4:56 PM   Dictation workstation:   [id number]      CT cervical spine wo IV contrast   Final Result   CT HEAD:   1. No acute intracranial abnormality or calvarial fracture.             CT CERVICAL SPINE:   1. No acute fracture or traumatic malalignment of the cervical spine.        MACRO:   None        Signed by: Kurtis Webb 1/21/2024 4:56 PM   Dictation workstation:   [id number]            Medical Decision Making  EKG my interpretation junctional rhythm at a rate of 71 with no ST elevations.  She will be given meclizine as she is describing a room spinning sensation.  D-dimer 1406 and will order CT PE chest.  Denies lower leg pain or swelling.Creatinine 1.16 and previously normal.  She will be given IV fluids.  Positive for influenza A.  Negative for COVID.No acute findings on final read of CT head and CT cervical spine.  Patient ambulated without any vertigo type symptoms.  No other significant abnormalities.  Urine positive for nitrites, leukocytes, WBCs and bacteria.  She will be started on Keflex for UTI.No acute findings on final read of chest x-ray.Final read of CT PE chest is negative for PE, but does show bibasilar groundglass opacities, more pronounced on the left, which could relate to atelectasis scarring or pneumonia.  There is also mentions a partially imaged moderate left pelvocaliectasis with mid left perinephric edema suggesting UPJ more distal obstruction with or without superimposed infection.  This does suggest to add on a CT abdomen pelvis without contrast.  Due to the possible start of the pneumonia in the left lower lung and the possible obstructed and infected stone, will give Zosyn.CT abdomen pelvis was nondiagnostic due to presence of contrast.  Due to the influenza and  possibly obstructed and infected stone, will admit to the hospitalist.  Case discussed with the hospitalist who accepts admission.    Procedure  Procedures     Juanita Finnegan PA-C  01/21/24 2048

## 2024-01-22 LAB
ANION GAP SERPL CALC-SCNC: 12 MMOL/L (ref 10–20)
ATRIAL RATE: 71 BPM
BUN SERPL-MCNC: 22 MG/DL (ref 6–23)
CALCIUM SERPL-MCNC: 8.1 MG/DL (ref 8.6–10.3)
CHLORIDE SERPL-SCNC: 106 MMOL/L (ref 98–107)
CO2 SERPL-SCNC: 23 MMOL/L (ref 21–32)
CREAT SERPL-MCNC: 0.8 MG/DL (ref 0.5–1.05)
EGFRCR SERPLBLD CKD-EPI 2021: 74 ML/MIN/1.73M*2
ERYTHROCYTE [DISTWIDTH] IN BLOOD BY AUTOMATED COUNT: 14.4 % (ref 11.5–14.5)
GLUCOSE SERPL-MCNC: 80 MG/DL (ref 74–99)
HCT VFR BLD AUTO: 37.4 % (ref 36–46)
HGB BLD-MCNC: 12 G/DL (ref 12–16)
HOLD SPECIMEN: NORMAL
HOLD SPECIMEN: NORMAL
MCH RBC QN AUTO: 29 PG (ref 26–34)
MCHC RBC AUTO-ENTMCNC: 32.1 G/DL (ref 32–36)
MCV RBC AUTO: 90 FL (ref 80–100)
NRBC BLD-RTO: 0 /100 WBCS (ref 0–0)
P OFFSET: 159 MS
P ONSET: 139 MS
PLATELET # BLD AUTO: 175 X10*3/UL (ref 150–450)
POTASSIUM SERPL-SCNC: 3.6 MMOL/L (ref 3.5–5.3)
Q ONSET: 214 MS
QRS COUNT: 12 BEATS
QRS DURATION: 84 MS
QT INTERVAL: 424 MS
QTC CALCULATION(BAZETT): 460 MS
QTC FREDERICIA: 448 MS
R AXIS: 22 DEGREES
RBC # BLD AUTO: 4.14 X10*6/UL (ref 4–5.2)
SODIUM SERPL-SCNC: 137 MMOL/L (ref 136–145)
T AXIS: 35 DEGREES
T OFFSET: 426 MS
VENTRICULAR RATE: 71 BPM
WBC # BLD AUTO: 4.9 X10*3/UL (ref 4.4–11.3)

## 2024-01-22 PROCEDURE — 2500000001 HC RX 250 WO HCPCS SELF ADMINISTERED DRUGS (ALT 637 FOR MEDICARE OP): Performed by: FAMILY MEDICINE

## 2024-01-22 PROCEDURE — 2500000002 HC RX 250 W HCPCS SELF ADMINISTERED DRUGS (ALT 637 FOR MEDICARE OP, ALT 636 FOR OP/ED): Performed by: STUDENT IN AN ORGANIZED HEALTH CARE EDUCATION/TRAINING PROGRAM

## 2024-01-22 PROCEDURE — 94640 AIRWAY INHALATION TREATMENT: CPT

## 2024-01-22 PROCEDURE — 85027 COMPLETE CBC AUTOMATED: CPT | Performed by: STUDENT IN AN ORGANIZED HEALTH CARE EDUCATION/TRAINING PROGRAM

## 2024-01-22 PROCEDURE — 99239 HOSP IP/OBS DSCHRG MGMT >30: CPT | Performed by: FAMILY MEDICINE

## 2024-01-22 PROCEDURE — 82374 ASSAY BLOOD CARBON DIOXIDE: CPT | Performed by: STUDENT IN AN ORGANIZED HEALTH CARE EDUCATION/TRAINING PROGRAM

## 2024-01-22 PROCEDURE — 36415 COLL VENOUS BLD VENIPUNCTURE: CPT | Performed by: STUDENT IN AN ORGANIZED HEALTH CARE EDUCATION/TRAINING PROGRAM

## 2024-01-22 PROCEDURE — 2500000001 HC RX 250 WO HCPCS SELF ADMINISTERED DRUGS (ALT 637 FOR MEDICARE OP): Performed by: STUDENT IN AN ORGANIZED HEALTH CARE EDUCATION/TRAINING PROGRAM

## 2024-01-22 PROCEDURE — 1200000002 HC GENERAL ROOM WITH TELEMETRY DAILY

## 2024-01-22 PROCEDURE — 2500000004 HC RX 250 GENERAL PHARMACY W/ HCPCS (ALT 636 FOR OP/ED): Performed by: FAMILY MEDICINE

## 2024-01-22 PROCEDURE — 2500000004 HC RX 250 GENERAL PHARMACY W/ HCPCS (ALT 636 FOR OP/ED): Performed by: STUDENT IN AN ORGANIZED HEALTH CARE EDUCATION/TRAINING PROGRAM

## 2024-01-22 PROCEDURE — 2500000001 HC RX 250 WO HCPCS SELF ADMINISTERED DRUGS (ALT 637 FOR MEDICARE OP): Performed by: NURSE PRACTITIONER

## 2024-01-22 RX ORDER — POTASSIUM CHLORIDE 750 MG/1
10 TABLET, FILM COATED, EXTENDED RELEASE ORAL DAILY
Status: DISCONTINUED | OUTPATIENT
Start: 2024-01-22 | End: 2024-01-23 | Stop reason: HOSPADM

## 2024-01-22 RX ORDER — CHOLESTYRAMINE 4 G/4.8G
4 POWDER, FOR SUSPENSION ORAL 2 TIMES DAILY
Status: DISCONTINUED | OUTPATIENT
Start: 2024-01-22 | End: 2024-01-23 | Stop reason: HOSPADM

## 2024-01-22 RX ORDER — IPRATROPIUM BROMIDE AND ALBUTEROL SULFATE 2.5; .5 MG/3ML; MG/3ML
3 SOLUTION RESPIRATORY (INHALATION) EVERY 4 HOURS PRN
Status: DISCONTINUED | OUTPATIENT
Start: 2024-01-22 | End: 2024-01-23 | Stop reason: HOSPADM

## 2024-01-22 RX ORDER — LEVOTHYROXINE SODIUM 75 UG/1
75 TABLET ORAL DAILY
Status: DISCONTINUED | OUTPATIENT
Start: 2024-01-22 | End: 2024-01-23 | Stop reason: HOSPADM

## 2024-01-22 RX ORDER — GUAIFENESIN 600 MG/1
600 TABLET, EXTENDED RELEASE ORAL EVERY 12 HOURS PRN
Qty: 10 TABLET | Refills: 0 | Status: SHIPPED | OUTPATIENT
Start: 2024-01-22

## 2024-01-22 RX ORDER — FUROSEMIDE 40 MG/1
20 TABLET ORAL DAILY
Status: DISCONTINUED | OUTPATIENT
Start: 2024-01-22 | End: 2024-01-23 | Stop reason: HOSPADM

## 2024-01-22 RX ORDER — CEPHALEXIN 500 MG/1
500 CAPSULE ORAL 2 TIMES DAILY
Qty: 14 CAPSULE | Refills: 0 | Status: SHIPPED | OUTPATIENT
Start: 2024-01-22 | End: 2024-01-29

## 2024-01-22 RX ORDER — HYDRALAZINE HYDROCHLORIDE 20 MG/ML
10 INJECTION INTRAMUSCULAR; INTRAVENOUS EVERY 6 HOURS PRN
Status: DISCONTINUED | OUTPATIENT
Start: 2024-01-22 | End: 2024-01-23 | Stop reason: HOSPADM

## 2024-01-22 RX ORDER — MECLIZINE HYDROCHLORIDE 25 MG/1
25 TABLET ORAL 3 TIMES DAILY PRN
Qty: 30 TABLET | Refills: 0 | Status: SHIPPED | OUTPATIENT
Start: 2024-01-22

## 2024-01-22 RX ADMIN — FUROSEMIDE 20 MG: 40 TABLET ORAL at 09:02

## 2024-01-22 RX ADMIN — GUAIFENESIN 600 MG: 600 TABLET, EXTENDED RELEASE ORAL at 01:08

## 2024-01-22 RX ADMIN — CEFTRIAXONE SODIUM 1 G: 1 INJECTION, SOLUTION INTRAVENOUS at 09:02

## 2024-01-22 RX ADMIN — Medication 3 MG: at 21:10

## 2024-01-22 RX ADMIN — SODIUM CHLORIDE 100 ML/HR: 9 INJECTION, SOLUTION INTRAVENOUS at 09:46

## 2024-01-22 RX ADMIN — CHOLESTYRAMINE 4 G: 4 POWDER, FOR SUSPENSION ORAL at 21:09

## 2024-01-22 RX ADMIN — POTASSIUM CHLORIDE 10 MEQ: 750 TABLET, EXTENDED RELEASE ORAL at 09:02

## 2024-01-22 RX ADMIN — ENOXAPARIN SODIUM 40 MG: 40 INJECTION SUBCUTANEOUS at 01:08

## 2024-01-22 RX ADMIN — SODIUM CHLORIDE 100 ML/HR: 9 INJECTION, SOLUTION INTRAVENOUS at 01:08

## 2024-01-22 RX ADMIN — LOSARTAN POTASSIUM: 50 TABLET, FILM COATED ORAL at 11:36

## 2024-01-22 RX ADMIN — CHOLESTYRAMINE 4 G: 4 POWDER, FOR SUSPENSION ORAL at 09:02

## 2024-01-22 RX ADMIN — Medication 3 MG: at 01:08

## 2024-01-22 RX ADMIN — LEVOTHYROXINE SODIUM 75 MCG: 75 TABLET ORAL at 05:44

## 2024-01-22 RX ADMIN — SODIUM CHLORIDE 100 ML/HR: 9 INJECTION, SOLUTION INTRAVENOUS at 19:00

## 2024-01-22 RX ADMIN — IPRATROPIUM BROMIDE AND ALBUTEROL SULFATE 3 ML: 2.5; .5 SOLUTION RESPIRATORY (INHALATION) at 07:35

## 2024-01-22 ASSESSMENT — COGNITIVE AND FUNCTIONAL STATUS - GENERAL
MOBILITY SCORE: 24
DAILY ACTIVITIY SCORE: 24
MOBILITY SCORE: 24
DAILY ACTIVITIY SCORE: 24

## 2024-01-22 ASSESSMENT — PAIN SCALES - GENERAL
PAINLEVEL_OUTOF10: 0 - NO PAIN
PAINLEVEL_OUTOF10: 0 - NO PAIN

## 2024-01-22 ASSESSMENT — PAIN - FUNCTIONAL ASSESSMENT: PAIN_FUNCTIONAL_ASSESSMENT: 0-10

## 2024-01-22 NOTE — CARE PLAN
Problem: Safety - Adult  Goal: Free from fall injury  Outcome: Progressing     The patient's goals for the shift include safety    The clinical goals for the shift include safety

## 2024-01-22 NOTE — PROGRESS NOTES
Physical Therapy                 Therapy Communication Note    Patient Name: Rosana Hopkins  MRN: [id number]  Today's Date: 1/22/2024     Discipline: Physical Therapy    Missed Visit Reason: 1020- Missed Visit Reason: Other (Comment) (PT screen/ DC. per pt all symptoms have resolved and pt has been getting up and walking Independently in the room . reports shes back to her baseline. per RN report pt has been getting up on her own IND and safely .  no PT needs at this time.)    Missed Time: Attempt    Comment:

## 2024-01-22 NOTE — H&P
History Of Present Illness  This is an 83 years old female presented to the hospital with chief complaint of dizziness.  Symptoms has been going on for the last 3 to 4 days.  About 2 days ago and patient had an incidence of fall.  She fell in her kitchen.  She does not believe she hit her head.  She remembers herself falling down but she woke up with her dog licking her face.  Patient reported symptoms of spinning sensation.  She has not had vertigo in the past.    In the emergency department she was given 25 mg of Antivert and half an hour later.  Dizziness completely resolved.    Patient also reported symptoms of coughing for the last 5 to 7 days.  She tested positive for influenza A.  She reported no chest pain.  No nausea vomiting or diarrhea.    Patient also denies any symptoms of dysuria however she underwent a CT angiogram of her chest which was done because her D-dimer was elevated.  On her CT angiogram chest there was a concern about left pelvicaliectasis with mild left perinephric edema suggesting UPJ or more distal obstruction with or without superimposed infection.  The patient had no abdominal pain, no flank pain and no dysuria.  On her blood work she had no leukocytosis and she had no fever.  This was followed by CT abdomen and pelvis that was not diagnostic.  At this time patient is admitted for further evaluation.    Past medical history    Hypertension  Hyperlipidemia  Arthritis  Hypothyroidism  Patient denies diabetes, denies coronary artery disease    Social history no smoking no alcohol, she lives at home with her , her son and dog         Past Medical History  She has a past medical history of Xerosis of skin (01/10/2022).    Surgical History  She has a past surgical history that includes Other surgical history (09/10/2020); Other surgical history (09/10/2020); Other surgical history (09/10/2020); and Other surgical history (09/10/2020).     Social History  She reports that she has never  smoked. She has never been exposed to tobacco smoke. She has never used smokeless tobacco. She reports current alcohol use. She reports that she does not use drugs.    Family History  No family history on file.     10 point review of systems negative except per HPI      Last Recorded Vitals  /79   Pulse 70   Temp 37 °C (98.6 °F) (Temporal)   Resp 20   Wt 103 kg (228 lb)   SpO2 96%     Physical Exam   Gen: NAD  HEENT: EOM, MMM  CV: RRR, no murmurs rubs or gallops  Resp: Clear to auscultation bilaterally  Abdomen: soft, NT,+BS  LE: No edema  Neuro: CN 2-12 intact        Relevant Results  Labs Reviewed   CBC WITH AUTO DIFFERENTIAL - Abnormal       Result Value    WBC 6.2      nRBC 0.0      RBC 4.74      Hemoglobin 13.8      Hematocrit 42.4      MCV 90      MCH 29.1      MCHC 32.5      RDW 14.4      Platelets 205      Neutrophils % 53.2      Immature Granulocytes %, Automated 0.2      Lymphocytes % 31.0      Monocytes % 14.5      Eosinophils % 0.8      Basophils % 0.3      Neutrophils Absolute 3.29      Immature Granulocytes Absolute, Automated 0.01      Lymphocytes Absolute 1.92      Monocytes Absolute 0.90 (*)     Eosinophils Absolute 0.05      Basophils Absolute 0.02     COMPREHENSIVE METABOLIC PANEL - Abnormal    Glucose 85      Sodium 135 (*)     Potassium 3.6      Chloride 103      Bicarbonate 21      Anion Gap 15      Urea Nitrogen 26 (*)     Creatinine 1.16 (*)     eGFR 47 (*)     Calcium 8.6      Albumin 3.7      Alkaline Phosphatase 46      Total Protein 6.6      AST 16      Bilirubin, Total 0.6      ALT 13     D-DIMER, NON VTE - Abnormal    D-Dimer Non VTE, Quant (ng/mL FEU) 1,406 (*)     Narrative:     The D-Dimer assay is reported in ng/mL Fibrinogen Equivalent Units (FEU). The results of this assay should NOT be used for the exclusion of Deep Vein Thrombosis and/or Pulmonary Embolism.   INFLUENZA A AND B PCR - Abnormal    Flu A Result Detected (*)     Flu B Result Not Detected      Narrative:      This assay is an in vitro diagnostic multiplex nucleic acid amplification test for the detection and discrimination of Influenza A & B from nasopharyngeal specimens, and has been validated for use at [facility]. Negative results do not preclude Influenza A/B infections, and should not be used as the sole basis for diagnosis, treatment, or other management decisions. If Influenza A/B and RSV PCR results are negative, testing for Parainfluenza virus, Adenovirus and Metapneumovirus is routinely performed for [facility] pediatric oncology and intensive care inpatients, and is available on other patients by placing an add-on request.   URINALYSIS WITH REFLEX CULTURE AND MICROSCOPIC - Abnormal    Color, Urine Yellow      Appearance, Urine Hazy (*)     Specific Gravity, Urine 1.016      pH, Urine 5.0      Protein, Urine NEGATIVE      Glucose, Urine NEGATIVE      Blood, Urine NEGATIVE      Ketones, Urine 5 (TRACE) (*)     Bilirubin, Urine NEGATIVE      Urobilinogen, Urine <2.0      Nitrite, Urine POSITIVE (*)     Leukocyte Esterase, Urine SMALL (1+) (*)    MICROSCOPIC ONLY, URINE - Abnormal    WBC, Urine 11-20 (*)     RBC, Urine 1-2      Squamous Epithelial Cells, Urine 1-9 (SPARSE)      Bacteria, Urine 4+ (*)     Mucus, Urine 2+      Hyaline Casts, Urine 3+ (*)    MAGNESIUM - Normal    Magnesium 1.97     PROTIME-INR - Normal    Protime 12.3      INR 1.1     TROPONIN I, HIGH SENSITIVITY - Normal    Troponin I, High Sensitivity 10      Narrative:     Less than 99th percentile of normal range cutoff-  Female and children under 18 years old <14 ng/L; Male <21 ng/L: Negative  Repeat testing should be performed if clinically indicated.     Female and children under 18 years old 14-50 ng/L; Male 21-50 ng/L:  Consistent with possible cardiac damage and possible increased clinical   risk. Serial measurements may help to assess extent of myocardial damage.     >50 ng/L: Consistent with cardiac damage, increased  clinical risk and  myocardial infarction. Serial measurements may help assess extent of   myocardial damage.      NOTE: Children less than 1 year old may have higher baseline troponin   levels and results should be interpreted in conjunction with the overall   clinical context.     NOTE: Troponin I testing is performed using a different   testing methodology at [facility] than at other   [facility]. Direct result comparisons should only   be made within the same method.   SARS-COV-2 PCR, SYMPTOMATIC - Normal    Coronavirus 2019, PCR Not Detected      Narrative:     This assay has received FDA Emergency Use Authorization (EUA) and is only authorized for the duration of time that circumstances exist to justify the authorization of the emergency use of in vitro diagnostic tests for the detection of SARS-CoV-2 virus and/or diagnosis of COVID-19 infection under section 564(b)(1) of the Act, 21 U.S.C. 360bbb-3(b)(1). This assay is an in vitro diagnostic nucleic acid amplification test for the qualitative detection of SARS-CoV-2 from nasopharyngeal specimens and has been validated for use at [facility]. Negative results do not preclude COVID-19 infections and should not be used as the sole basis for diagnosis, treatment, or other management decisions.     LACTATE - Normal    Lactate 0.9      Narrative:     Venipuncture immediately after or during the administration of Metamizole may lead to falsely low results. Testing should be performed immediately  prior to Metamizole dosing.   URINE CULTURE   BLOOD CULTURE   BLOOD CULTURE   URINALYSIS WITH REFLEX CULTURE AND MICROSCOPIC    Narrative:     The following orders were created for panel order Urinalysis with Reflex Culture and Microscopic.  Procedure                               Abnormality         Status                     ---------                               -----------         ------                     Urinalysis with  Reflex C...[[id number]]  Abnormal            Final result               Extra Urine Gray Tube[[id number]]                            In process                   Please view results for these tests on the individual orders.   EXTRA URINE GRAY TUBE     CT abdomen pelvis wo IV contrast   Final Result   Bilateral renal sinus cysts noted which corresponds to fullness   described on prior PE CT. Note is made of contrast within the   collecting system related to prior PE CT. Evaluation for renal,   ureteral or bladder calculi is nondiagnostic due to presence of   contrast. No evidence for obstructive uropathy.        Diffuse scattered colonic diverticula most marked in the descending   and sigmoid colon without evidence for acute diverticulitis        Additional findings as described above.        MACRO:   None        Signed by: Angela Forrest 1/21/2024 7:56 PM   Dictation workstation:   [id number]      CT angio chest for pulmonary embolism   Final Result   Limited exam secondary to motion artifact. No pulmonary embolus to   the proximal segmental level.        Respiratory motion artifact limits evaluation of lung parenchyma.   There is bibasilar reticular and ground-glass opacity, more   pronounced on the left, which could relate to atelectasis, scarring   or pneumonia. No focal consolidation. Trace pleural effusions. No   pneumothorax.        Partially imaged moderate left pelvicaliectasis with mild left   perinephric edema suggesting UPJ or more distal obstruction with or   without superimposed infection. Suggest correlation with CT abdomen   and pelvis.        Cardiomegaly.        Atherosclerosis, including coronary artery disease.        MACRO:   None        Signed by: Brett Fernando 1/21/2024 6:41 PM   Dictation workstation:   [id number]      XR chest 1 view   Final Result   1.  No evidence of acute cardiopulmonary process.             Signed by: eDrrek Treivno 1/21/2024 6:03 PM   Dictation workstation:   [id number]       CT head wo IV contrast   Final Result   CT HEAD:   1. No acute intracranial abnormality or calvarial fracture.             CT CERVICAL SPINE:   1. No acute fracture or traumatic malalignment of the cervical spine.        MACRO:   None        Signed by: Kurtis Webb 1/21/2024 4:56 PM   Dictation workstation:   [id number]      CT cervical spine wo IV contrast   Final Result   CT HEAD:   1. No acute intracranial abnormality or calvarial fracture.             CT CERVICAL SPINE:   1. No acute fracture or traumatic malalignment of the cervical spine.        MACRO:   None        Signed by: Kurtis Webb 1/21/2024 4:56 PM   Dictation workstation:   [id number]                   PROBLEM LIST    Dizziness and vertigo  Acute kidney injury  Positive for influenza A  Abnormal CT chest showing concern of left pelvicaliectasis concerning for UPJ obstruction  Acute kidney injury  UTI        TREATMENT PLAN    Regarding her dizziness.  Symptoms appears to be vertigo related.  Responded completely to Antivert.  I will add Antivert as needed.  If further dizziness is noticed then further workup will be considered such as brain MRI.  CT head in ED is negative.    Regarding acute kidney injury.  She will be given IV fluid and kidney function will be rechecked tomorrow.  Sodium is slightly low.  Slight dehydration is noticed on examination.    Positive for influenza A but outside the window to benefit from Tamiflu.  Supportive management.  Not hypoxic.  Bronchodilator as she has mild bilateral wheezing    Abnormal CT chest is noticed.  At this time will consider CT urogram however before ordering that we will have to check her kidney function as she did receive contrast today.  Also urology will be consulted.    Physical therapy will be consulted for weakness    IV Rocephin for UTI and concern of pyelonephritis even though she has no symptoms        Joel Gabriel MD

## 2024-01-22 NOTE — DISCHARGE SUMMARY
Discharge Diagnosis  Vertigo    Issues Requiring Follow-Up  Vertigo  UTI  Influenza A  UPJ obstruction   Acute kidney injury    Discharge Meds     Your medication list        START taking these medications        Instructions Last Dose Given Next Dose Due   cephalexin 500 mg capsule  Commonly known as: Keflex      Take 1 capsule (500 mg) by mouth 2 times a day for 7 days.       guaiFENesin 600 mg 12 hr tablet  Commonly known as: Mucinex      Take 1 tablet (600 mg) by mouth every 12 hours if needed for congestion. Do not crush, chew, or split.       meclizine 25 mg tablet  Commonly known as: Antivert      Take 1 tablet (25 mg) by mouth 3 times a day as needed for dizziness.              CONTINUE taking these medications        Instructions Last Dose Given Next Dose Due   albuterol 90 mcg/actuation inhaler           ammonium lactate 12 % lotion  Commonly known as: Lac-Hydrin      Apply topically if needed for dry skin.       biotin 10 mg tablet           cranberry extract 200 mg capsule           furosemide 20 mg tablet  Commonly known as: Lasix      Take 1 tablet (20 mg) by mouth once daily.       krill-omega-3-dha-epa-lipids 494-70-10-50 mg capsule           levothyroxine 75 mcg tablet  Commonly known as: Synthroid, Levoxyl      Take 1 tablet (75 mcg) by mouth once daily.       losartan-hydrochlorothiazide 100-25 mg tablet  Commonly known as: Hyzaar      Take 1 tablet by mouth once daily.       potassium chloride CR 10 mEq ER tablet  Commonly known as: Klor-Con      Take 1 tablet (10 mEq) by mouth once daily. Do not crush, chew, or split.       Prevalite 4 gram packet  Generic drug: cholestyramine-aspartame      MIX 1 SCOOP WITH LIQUID AND  DRINK BY MOUTH ONCE DAILY                 Where to Get Your Medications        These medications were sent to [id number] - [place], [place] - [address] [place] [address]      Phone: [phone]   cephalexin 500 mg capsule  guaiFENesin 600 mg  12 hr tablet  meclizine 25 mg tablet         Test Results Pending At Discharge  Pending Labs       Order Current Status    Urine Culture In process    Blood Culture Preliminary result    Blood Culture Preliminary result            Hospital Course   This is an 83 years old female presented to the hospital with chief complaint of dizziness.  Symptoms has been going on for the last 3 to 4 days.  About 2 days ago and patient had an incidence of fall.  She fell in her kitchen.  She does not believe she hit her head.  She remembers herself falling down but she woke up with her dog licking her face.  Patient reported symptoms of spinning sensation.  She has not had vertigo in the past.     In the emergency department she was given 25 mg of Antivert and half an hour later.  Dizziness completely resolved.     Patient also reported symptoms of coughing for the last 5 to 7 days.  She tested positive for influenza A.  She reported no chest pain.  No nausea vomiting or diarrhea.     Patient also denies any symptoms of dysuria however she underwent a CT angiogram of her chest which was done because her D-dimer was elevated.  On her CT angiogram chest there was a concern about left pelvicaliectasis with mild left perinephric edema suggesting UPJ or more distal obstruction with or without superimposed infection.  The patient had no abdominal pain, no flank pain and no dysuria.  On her blood work she had no leukocytosis and she had no fever.  This was followed by CT abdomen and pelvis that was not diagnostic.  At this time patient is admitted for further evaluation.    The patient improved with IV hydration, she is does not feel dizzy anymore.  She was treated with IV ceftriaxone for urinary tract infection was discharged on Keflex for total of 7 days.  Influenza A was out of the window to benefit from treatment, so Tamiflu was not started.  Renal function returned to normal.  She was evaluated by urology for UPJ obstruction and was  discharged home in a stable condition with close follow-up with urology and her primary care physician    Pertinent Physical Exam At Time of Discharge  Physical Exam  Gen: NAD  HEENT: EOM, MMM  CV: RRR, no murmurs rubs or gallops  Resp: Clear to auscultation bilaterally  Abdomen: soft, NT,+BS  LE: No edema  Neuro: CN 2-12 intact    Outpatient Follow-Up  Future Appointments   Date Time Provider Department Center   4/15/2024  7:30 AM Ismael Gan MD [id number] [place]         Arjun Baum MD

## 2024-01-23 VITALS
HEIGHT: 60 IN | BODY MASS INDEX: 43.46 KG/M2 | DIASTOLIC BLOOD PRESSURE: 74 MMHG | WEIGHT: 221.34 LBS | RESPIRATION RATE: 16 BRPM | OXYGEN SATURATION: 94 % | HEART RATE: 71 BPM | SYSTOLIC BLOOD PRESSURE: 183 MMHG | TEMPERATURE: 97.7 F

## 2024-01-23 PROCEDURE — 2500000004 HC RX 250 GENERAL PHARMACY W/ HCPCS (ALT 636 FOR OP/ED): Performed by: FAMILY MEDICINE

## 2024-01-23 PROCEDURE — 2500000001 HC RX 250 WO HCPCS SELF ADMINISTERED DRUGS (ALT 637 FOR MEDICARE OP): Performed by: NURSE PRACTITIONER

## 2024-01-23 PROCEDURE — 2500000004 HC RX 250 GENERAL PHARMACY W/ HCPCS (ALT 636 FOR OP/ED): Performed by: STUDENT IN AN ORGANIZED HEALTH CARE EDUCATION/TRAINING PROGRAM

## 2024-01-23 PROCEDURE — 99232 SBSQ HOSP IP/OBS MODERATE 35: CPT | Performed by: HOSPITALIST

## 2024-01-23 PROCEDURE — 2500000001 HC RX 250 WO HCPCS SELF ADMINISTERED DRUGS (ALT 637 FOR MEDICARE OP): Performed by: FAMILY MEDICINE

## 2024-01-23 RX ADMIN — CHOLESTYRAMINE 4 G: 4 POWDER, FOR SUSPENSION ORAL at 08:11

## 2024-01-23 RX ADMIN — HYDRALAZINE HYDROCHLORIDE 10 MG: 20 INJECTION INTRAMUSCULAR; INTRAVENOUS at 00:14

## 2024-01-23 RX ADMIN — LEVOTHYROXINE SODIUM 75 MCG: 75 TABLET ORAL at 05:01

## 2024-01-23 RX ADMIN — LOSARTAN POTASSIUM: 50 TABLET, FILM COATED ORAL at 08:11

## 2024-01-23 RX ADMIN — FUROSEMIDE 20 MG: 40 TABLET ORAL at 08:11

## 2024-01-23 RX ADMIN — POTASSIUM CHLORIDE 10 MEQ: 750 TABLET, EXTENDED RELEASE ORAL at 08:11

## 2024-01-23 RX ADMIN — ENOXAPARIN SODIUM 40 MG: 40 INJECTION SUBCUTANEOUS at 00:00

## 2024-01-23 RX ADMIN — SODIUM CHLORIDE 100 ML/HR: 9 INJECTION, SOLUTION INTRAVENOUS at 00:13

## 2024-01-23 RX ADMIN — CEFTRIAXONE SODIUM 1 G: 1 INJECTION, SOLUTION INTRAVENOUS at 08:11

## 2024-01-23 NOTE — CARE PLAN
The patient's goals for the shift include safety    The clinical goals for the shift include safety    Problem: Pain - Adult  Goal: Verbalizes/displays adequate comfort level or baseline comfort level  Outcome: Progressing     Problem: Safety - Adult  Goal: Free from fall injury  Outcome: Progressing     Problem: Discharge Planning  Goal: Discharge to home or other facility with appropriate resources  Outcome: Progressing     Problem: Discharge Planning  Goal: Discharge to home or other facility with appropriate resources  Outcome: Progressing     Problem: Chronic Conditions and Co-morbidities  Goal: Patient's chronic conditions and co-morbidity symptoms are monitored and maintained or improved  Outcome: Progressing     Problem: Fall/Injury  Goal: Not fall by end of shift  Outcome: Progressing  Goal: Be free from injury by end of the shift  Outcome: Progressing  Goal: Verbalize understanding of personal risk factors for fall in the hospital  Outcome: Progressing  Goal: Verbalize understanding of risk factor reduction measures to prevent injury from fall in the home  Outcome: Progressing  Goal: Use assistive devices by end of the shift  Outcome: Progressing  Goal: Pace activities to prevent fatigue by end of the shift  Outcome: Progressing

## 2024-01-23 NOTE — PROGRESS NOTES
Rosana Hopkins is a 83 y.o. female on day 2 of admission presenting with dizziness and uti       Subjective   Feeling better.    Objective     Last Recorded Vitals  BP (!) 183/74   Pulse 71   Temp 36.5 °C (97.7 °F)   Resp 16   Ht 1.524 m (5')   Wt 100 kg (221 lb 5.5 oz)   SpO2 94%   BMI 43.23 kg/m²      Intake/Output last 3 Shifts:    Intake/Output Summary (Last 24 hours) at 1/23/2024 0804  Last data filed at 1/22/2024 1026  Gross per 24 hour   Intake 825 ml   Output --   Net 825 ml       Physical Exam   Gen: NAD  HEENT: EOM, MMM  CV: RRR, no murmurs rubs or gallops  Resp: coarse rhonchi b/l   Abdomen: soft, NT,+BS  LE: No edema      Relevant Results  Lab Results   Component Value Date    WBC 4.9 01/22/2024    HGB 12.0 01/22/2024    HCT 37.4 01/22/2024    MCV 90 01/22/2024     01/22/2024     Lab Results   Component Value Date    GLUCOSE 80 01/22/2024    CALCIUM 8.1 (L) 01/22/2024     01/22/2024    K 3.6 01/22/2024    CO2 23 01/22/2024     01/22/2024    BUN 22 01/22/2024    CREATININE 0.80 01/22/2024         Assessment/Plan  83 year old female admitted with vertigo secondary from dehydration from UTI and influenza A infectin    -improved with IV fluids and IV abx abx, discharged on oral abx  -CT abdomen showed renal cysts and no evidence of obstructive uropathy, follow up with urology outpatient     Discharged home in stable condition.              Jame Forrest MD

## 2024-01-24 ENCOUNTER — PATIENT OUTREACH (OUTPATIENT)
Dept: PRIMARY CARE | Facility: CLINIC | Age: 84
End: 2024-01-24
Payer: MEDICARE

## 2024-01-24 LAB — BACTERIA UR CULT: ABNORMAL

## 2024-01-24 NOTE — NURSING NOTE
Lab called from [facility] to report positive blood cultures on 1/24/24 at 0443 for the following patient:    Rosana Hopkins  Female, 83 y.o., 1940  MRN: [id number]    Results were Gram positive cocci, clusters  Anaerobic Bottle Positive  Armand Coles MD was notified immediately after getting off the phone with lab. CCed in the direct chat were the managers of [employer] Sharonda Carrillo RN and Delores Rodriguez RN   Negative

## 2024-01-24 NOTE — PROGRESS NOTES
Discharge Facility: [facility]  Discharge Diagnosis: Vertigo   Admission Date: 1/21/23  Discharge Date: 1/23/23    PCP Appointment Date: 1/25/24  Specialist Appointment Date:   - Wali Hodges MD (Urology) in 1 week   Hospital Encounter and Summary: Linked   See discharge assessment below for further details    Medications  Medications reviewed with patient/caregiver?: Yes (new/changes only) (1/24/2024 12:35 PM)  Is the patient having any side effects they believe may be caused by any medication additions or changes?: No (1/24/2024 12:35 PM)  Does the patient have all medications ordered at discharge?: Yes (1/24/2024 12:35 PM)  Care Management Interventions: No intervention needed (1/24/2024 12:35 PM)  Prescription Comments: START: cephalexin, mucinex, meclizine (1/24/2024 12:35 PM)  Is the patient taking all medications as directed (includes completed medication regime)?: Yes (1/24/2024 12:35 PM)  Care Management Interventions: Provided patient education (1/24/2024 12:35 PM)    Appointments  Does the patient have a primary care provider?: Yes (1/24/2024 12:35 PM)  Care Management Interventions: Verified appointment date/time/provider (1/24/2024 12:35 PM)  Care Management Interventions: Advised patient to keep appointment (1/24/2024 12:35 PM)    Self Management  Has home health visited the patient within 72 hours of discharge?: Not applicable (1/24/2024 12:35 PM)    Patient Teaching  Does the patient have access to their discharge instructions?: Yes (1/24/2024 12:35 PM)  Care Management Interventions: Reviewed instructions with patient (1/24/2024 12:35 PM)  What is the patient's perception of their health status since discharge?: Improving (1/24/2024 12:35 PM)  Is the patient/caregiver able to teach back the hierarchy of who to call/visit for symptoms/problems? PCP, Specialist, Home Health nurse, Urgent Care, ED, 911: Yes (1/24/2024 12:35 PM)  Patient/Caregiver Education Comments: Patient reports she is feeling  better. States she is not dizzy anymore or coughing. States she never had any symptoms of infections. Is aware to follow up with urology. Denies questions at this time. (1/24/2024 12:35 PM)

## 2024-01-25 NOTE — DOCUMENTATION CLARIFICATION NOTE
PATIENT:               ROSANA BULLARD  ACCT #:                  [id number]  MRN:                       [id number]  :                       1940  ADMIT DATE:       2024 3:48 PM  DISCH DATE:        2024 10:55 AM  RESPONDING PROVIDER #:        [id number]          PROVIDER RESPONSE TEXT:    Vertigo related to dehydration    CDI QUERY TEXT:    [facility]_Etiology Linkage        Instruction:    Based on your assessment of the patient and the clinical information, please provide the requested documentation by clicking on the appropriate radio button and enter any additional information if prompted.    Question: Please clarify if a relationship exists between    When answering this query, please exercise your independent professional judgment. The fact that a question is being asked, does not imply that any particular answer is desired or expected.    The patient's clinical indicators include:  Clinical Information: 84 yo female with vertigo, SVETA, UTI, dehydration, Influenza A    Clinical Indicators:   vital signs  T37 HR78 R16 /61 98% RA  H+P, Regarding acute kidney injury.  She will be given IV fluid and kidney function will be rechecked tomorrow.  Sodium is slightly low.  Slight dehydration is noticed on examination.,    Treatment:  NS IV bolus 500 ml     Risk Factors: Vertigo, dehydration, SVETA, UTI, Influenza A  Options provided:  -- Vertigo related to dehydration  -- Vertigo related to SVETA  -- Vertigo related to UTI  -- Other - I will add my own diagnosis  -- Refer to Clinical Documentation Reviewer    Query created by: Rosana Paniagua on 2024 9:54 AM      Electronically signed by:  NATASHA SHARPE MD 2024 9:23 PM

## 2024-01-26 ENCOUNTER — OFFICE VISIT (OUTPATIENT)
Dept: PRIMARY CARE | Facility: CLINIC | Age: 84
End: 2024-01-26
Payer: MEDICARE

## 2024-01-26 VITALS
WEIGHT: 211 LBS | SYSTOLIC BLOOD PRESSURE: 116 MMHG | TEMPERATURE: 97.3 F | OXYGEN SATURATION: 96 % | RESPIRATION RATE: 16 BRPM | HEIGHT: 65 IN | HEART RATE: 67 BPM | DIASTOLIC BLOOD PRESSURE: 68 MMHG | BODY MASS INDEX: 35.16 KG/M2

## 2024-01-26 DIAGNOSIS — N28.89 PELVICALIECTASIS: ICD-10-CM

## 2024-01-26 DIAGNOSIS — Z09 HOSPITAL DISCHARGE FOLLOW-UP: ICD-10-CM

## 2024-01-26 DIAGNOSIS — N10 ACUTE PYELONEPHRITIS: ICD-10-CM

## 2024-01-26 DIAGNOSIS — N28.1 RENAL CYST: ICD-10-CM

## 2024-01-26 DIAGNOSIS — Z00.00 ROUTINE GENERAL MEDICAL EXAMINATION AT HEALTH CARE FACILITY: Primary | ICD-10-CM

## 2024-01-26 DIAGNOSIS — Z87.448 H/O PYELONEPHRITIS: ICD-10-CM

## 2024-01-26 DIAGNOSIS — N39.0 ACUTE UTI: ICD-10-CM

## 2024-01-26 DIAGNOSIS — H81.13 BENIGN PAROXYSMAL POSITIONAL VERTIGO DUE TO BILATERAL VESTIBULAR DISORDER: ICD-10-CM

## 2024-01-26 DIAGNOSIS — E66.01 CLASS 2 SEVERE OBESITY DUE TO EXCESS CALORIES WITH SERIOUS COMORBIDITY AND BODY MASS INDEX (BMI) OF 35.0 TO 35.9 IN ADULT (MULTI): ICD-10-CM

## 2024-01-26 DIAGNOSIS — M05.79 RHEUMATOID ARTHRITIS INVOLVING MULTIPLE SITES WITH POSITIVE RHEUMATOID FACTOR (MULTI): ICD-10-CM

## 2024-01-26 DIAGNOSIS — I73.9 PVD (PERIPHERAL VASCULAR DISEASE) (CMS-HCC): ICD-10-CM

## 2024-01-26 LAB
BACTERIA BLD AEROBE CULT: ABNORMAL
BACTERIA BLD CULT: ABNORMAL
BACTERIA BLD CULT: NORMAL
GRAM STN SPEC: ABNORMAL

## 2024-01-26 PROCEDURE — 1160F RVW MEDS BY RX/DR IN RCRD: CPT | Performed by: FAMILY MEDICINE

## 2024-01-26 PROCEDURE — 1036F TOBACCO NON-USER: CPT | Performed by: FAMILY MEDICINE

## 2024-01-26 PROCEDURE — 3074F SYST BP LT 130 MM HG: CPT | Performed by: FAMILY MEDICINE

## 2024-01-26 PROCEDURE — 87086 URINE CULTURE/COLONY COUNT: CPT

## 2024-01-26 PROCEDURE — 1111F DSCHRG MED/CURRENT MED MERGE: CPT | Performed by: FAMILY MEDICINE

## 2024-01-26 PROCEDURE — 1126F AMNT PAIN NOTED NONE PRSNT: CPT | Performed by: FAMILY MEDICINE

## 2024-01-26 PROCEDURE — 1159F MED LIST DOCD IN RCRD: CPT | Performed by: FAMILY MEDICINE

## 2024-01-26 PROCEDURE — 3078F DIAST BP <80 MM HG: CPT | Performed by: FAMILY MEDICINE

## 2024-01-26 PROCEDURE — 1158F ADVNC CARE PLAN TLK DOCD: CPT | Performed by: FAMILY MEDICINE

## 2024-01-26 PROCEDURE — 99495 TRANSJ CARE MGMT MOD F2F 14D: CPT | Performed by: FAMILY MEDICINE

## 2024-01-26 PROCEDURE — G0439 PPPS, SUBSEQ VISIT: HCPCS | Performed by: FAMILY MEDICINE

## 2024-01-26 PROCEDURE — 81001 URINALYSIS AUTO W/SCOPE: CPT

## 2024-01-26 PROCEDURE — 1123F ACP DISCUSS/DSCN MKR DOCD: CPT | Performed by: FAMILY MEDICINE

## 2024-01-26 PROCEDURE — 1170F FXNL STATUS ASSESSED: CPT | Performed by: FAMILY MEDICINE

## 2024-01-26 ASSESSMENT — PATIENT HEALTH QUESTIONNAIRE - PHQ9
SUM OF ALL RESPONSES TO PHQ9 QUESTIONS 1 AND 2: 0
2. FEELING DOWN, DEPRESSED OR HOPELESS: NOT AT ALL
1. LITTLE INTEREST OR PLEASURE IN DOING THINGS: NOT AT ALL

## 2024-01-26 ASSESSMENT — ACTIVITIES OF DAILY LIVING (ADL)
DRESSING: INDEPENDENT
MANAGING_FINANCES: INDEPENDENT
TAKING_MEDICATION: INDEPENDENT
BATHING: INDEPENDENT
DOING_HOUSEWORK: INDEPENDENT
GROCERY_SHOPPING: INDEPENDENT

## 2024-01-26 ASSESSMENT — ENCOUNTER SYMPTOMS
LOSS OF SENSATION IN FEET: 0
OCCASIONAL FEELINGS OF UNSTEADINESS: 0
DEPRESSION: 0

## 2024-01-26 NOTE — PATIENT INSTRUCTIONS
BMI was above normal measurement. Current weight: 95.7 kg (211 lb)  Weight change since last visit (-) denotes wt loss -10.34 lbs   Weight loss needed to achieve BMI 25: 61.1 Lbs  Weight loss needed to achieve BMI 30: 31.1 Lbs  Advised to Increase physical activity.      Ways to Help Prevent Falls at Home    Quick Tips   ? Ask for help if you need it. Most people want to help!   ? Get up slowly after sitting or laying down   ? Wear a medical alert device or keep cell phone in your pocket   ? Use night lights, especially areas near a bathroom   ? Keep the items you use often within reach on a small stool or end table   ? Use an assistive device such as walker or cane, as directed by provider/physical therapy   ? Use a non-slip mat and grab bars in your bathroom. Look for home health sections for best options     Other Areas to Focus On   ? Exercise and nutrition: Regular exercise or taking a falls prevention class are great ways improve strength and balance. Don’t forget to stay hydrated and bring a snack!   ? Medicine side effects: Some medicines can make you sleepy or dizzy, which could cause a fall. Ask your healthcare provider about the side effects your medicines could cause. Be sure to let them know if you take any vitamins or supplements as well.   ? Tripping hazards: Remove items you could trip on, such as loose mats, rugs, cords, and clutter. Wear closed toe shoes with rubber soles.   ? Health and wellness: Get regular checkups with your healthcare provider, plus routine vision and hearing screenings. Talk with your healthcare provider about:   o Your medicines and the possible side effects - bring them in a bag if that is easier!   o Problems with balance or feeling dizzy   o Ways to promote bone health, such as Vitamin D and calcium supplements   o Questions or concerns about falling     *Ask your healthcare team if you have questions     [facility], 2022

## 2024-01-26 NOTE — ASSESSMENT & PLAN NOTE
Repeat urine culture.  She had caliectasis and will evaluate with renal ultrasound.  We will order Ultrasound of the Kidneys for further evaluation.

## 2024-01-26 NOTE — PROGRESS NOTES
"Subjective   Patient ID: Rosana Hopkins is a 83 y.o. female who presents for Medicare Annual Wellness Visit Subsequent and Hospital Follow-up (1/21- 1/23/2024 Vertigo).    She presents today for Annual Medicare Wellness Visit and follow-up after being discharged from the hospital 3 days ago. The main problem requiring admission was Vertigo and Acute Cystitis. The discharge summary and/or Transitional Care Management documentation was reviewed. Medication reconciliation was performed as indicated via the \"Romie as Reviewed\" timestamp.     Rosana Hopkins was contacted by Transitional Care Management services two days after her discharge. This encounter and supporting documentation was reviewed.    The complexity of medical decision making for this patient's transitional care is moderate.    She presents today to follow up on Vertigo. The patient describes the symptoms as lightheadedness. Symptoms are exacerbated by none identified The patient also complains of none. Patient denies none.  She has been treated with antibiotic, with good improvement.     She also presents today to follow up on UTI. She has no back pain, fever, abdominal pain, vaginal discharge. Patient does not have a history of recurrent UTI.     Medications  Medications reviewed with patient/caregiver?: Yes (new/changes only) (1/24/2024 12:35 PM)  Is the patient having any side effects they believe may be caused by any medication additions or changes?: No (1/24/2024 12:35 PM)  Does the patient have all medications ordered at discharge?: Yes (1/24/2024 12:35 PM)  Care Management Interventions: No intervention needed (1/24/2024 12:35 PM)  Prescription Comments: START: cephalexin, mucinex, meclizine (1/24/2024 12:35 PM)  Is the patient taking all medications as directed (includes completed medication regime)?: Yes (1/24/2024 12:35 PM)  Care Management Interventions: Provided patient education (1/24/2024 12:35 PM)    Appointments  Does the patient " have a primary care provider?: Yes (1/24/2024 12:35 PM)  Care Management Interventions: Verified appointment date/time/provider (1/24/2024 12:35 PM)  Care Management Interventions: Advised patient to keep appointment (1/24/2024 12:35 PM)    Self Management  Has home health visited the patient within 72 hours of discharge?: Not applicable (1/24/2024 12:35 PM)    Patient Teaching  Does the patient have access to their discharge instructions?: Yes (1/24/2024 12:35 PM)  Care Management Interventions: Reviewed instructions with patient (1/24/2024 12:35 PM)  What is the patient's perception of their health status since discharge?: Improving (1/24/2024 12:35 PM)  Is the patient/caregiver able to teach back the hierarchy of who to call/visit for symptoms/problems? PCP, Specialist, Home Health nurse, Urgent Care, ED, 911: Yes (1/24/2024 12:35 PM)  Patient/Caregiver Education Comments: Patient reports she is feeling better. States she is not dizzy anymore or coughing. States she never had any symptoms of infections. Is aware to follow up with urology. Denies questions at this time. (1/24/2024 12:35 PM)        Past Medical, Surgical, and Family History reviewed and updated in chart.         Patient Self Assessment of Health Status  Patient Self Assessment: Good    Nutrition and Exercise  Current Diet: Well Balanced Diet  Adequate Fluid Intake: Yes  Caffeine: Yes  Exercise Frequency: Infrequently    Functional Ability/Level of Safety  Cognitive Impairment Observed: No cognitive impairment observed    Home Safety Risk Factors: None     Review of Systems   Constitutional:  Negative for chills and fever.   HENT:  Negative for congestion, ear pain, nosebleeds, rhinorrhea and sore throat.    Respiratory:  Negative for cough, shortness of breath and wheezing.    Cardiovascular:  Negative for chest pain, palpitations and leg swelling.   Gastrointestinal:  Negative for abdominal pain, constipation, diarrhea and vomiting.  "  Genitourinary:  Negative for dysuria, frequency and hematuria.   Neurological:  Negative for dizziness, tremors, numbness and headaches.       Objective   /68 (BP Location: Left arm, Patient Position: Sitting, BP Cuff Size: Large adult)   Pulse 67   Temp 36.3 °C (97.3 °F)   Resp 16   Ht 1.651 m (5' 5\")   Wt 95.7 kg (211 lb)   SpO2 96%   BMI 35.11 kg/m²     Physical Exam  Constitutional:       General: She is not in acute distress.     Appearance: Normal appearance.   HENT:      Head: Normocephalic and atraumatic.      Mouth/Throat:      Mouth: Mucous membranes are moist.      Pharynx: Oropharynx is clear. No oropharyngeal exudate or posterior oropharyngeal erythema.   Eyes:      General: No scleral icterus.     Extraocular Movements: Extraocular movements intact.      Pupils: Pupils are equal, round, and reactive to light.   Cardiovascular:      Rate and Rhythm: Normal rate and regular rhythm.      Pulses: Normal pulses.      Heart sounds: No murmur heard.     No friction rub. No gallop.   Pulmonary:      Effort: Pulmonary effort is normal.      Breath sounds: No wheezing, rhonchi or rales.   Skin:     General: Skin is warm.      Coloration: Skin is not jaundiced or pale.      Findings: No erythema or rash.   Neurological:      General: No focal deficit present.      Mental Status: She is alert and oriented to person, place, and time.      Cranial Nerves: No cranial nerve deficit.      Sensory: No sensory deficit.      Coordination: Coordination normal.      Gait: Gait normal.       === 01/21/24 ===    CT ABDOMEN PELVIS WO IV CONTRAST    - Impression -  Bilateral renal sinus cysts noted which corresponds to fullness  described on prior PE CT. Note is made of contrast within the  collecting system related to prior PE CT. Evaluation for renal,  ureteral or bladder calculi is nondiagnostic due to presence of  contrast. No evidence for obstructive uropathy.    Diffuse scattered colonic diverticula most " marked in the descending  and sigmoid colon without evidence for acute diverticulitis    Additional findings as described above.    MACRO:  None    Signed by: Angela Forrest 1/21/2024 7:56 PM  Dictation workstation:   [id number]     Assessment/Plan   Problem List Items Addressed This Visit       Rheumatoid arthritis involving multiple sites with positive rheumatoid factor (CMS/[facility])     Chronic Condition Documentation : Stable based on symptoms and exam.  Continue established treatment plan and follow-up at least yearly.         Class 2 severe obesity due to excess calories with serious comorbidity and body mass index (BMI) of 35.0 to 35.9 in adult (CMS/[facility])     Continue decrease calorie diet and not more than 1500 calorie per day diet and low-fat diet.  Continue with regular exercise program.  We advised exercise at least 5 days a week for at least 45 minutes and also a minimum of 10,000 steps a day.  The detrimental effects of obesity on health were discussed.         Routine general medical examination at health care facility - Primary     Recommend low-cholesterol diet, low-fat diet and low-salt diet.  The need for lifelong dietary compliance in order to reduce cardiac risk is recommended.  We will also recommend regular exercise program to improve lipid balance and overall health.  Recommend decreasing fat and cholesterol in diet, increasing aerobic exercise with a goal of 4 or more days per week         Pelvicaliectasis     We will order Ultrasound of the Bladder for further evaluation.         Relevant Orders    US bladder    Renal cyst     We will order Ultrasound of the Kidneys for further evaluation.         Relevant Orders    US renal complete    Acute pyelonephritis     Repeat urine culture.  She had caliectasis and will evaluate with renal ultrasound.  We will order Ultrasound of the Kidneys for further evaluation.         Benign paroxysmal positional vertigo due to bilateral vestibular disorder     The  nature of vertigo was discussed.  Patient was advised to take meclizine as needed. She was advised not to drive or operate machinery during the episodes or if the symptoms persists.  Follow-up with persistent all worsening symptoms or developing other neurological symptoms.  The nature of vertigo syndromes were discussed along with their usual course and treatment.  The risks and benefits of my recommendations, as well as other treatment options were discussed with the patient today.          Acute UTI     We will order Urinalysis and Urine Culture to make sure the infection has completely resolved.         Relevant Orders    Urinalysis with Reflex Microscopic    Urine Culture    PVD (peripheral vascular disease) (CMS/[facility])     Chronic Condition Documentation : Stable based on symptoms and exam.  Continue established treatment plan and follow-up at least yearly.          Other Visit Diagnoses       Hospital discharge follow-up                 Medications  Medications reviewed with patient/caregiver?: Yes (new/changes only) (1/24/2024 12:35 PM)  Is the patient having any side effects they believe may be caused by any medication additions or changes?: No (1/24/2024 12:35 PM)  Does the patient have all medications ordered at discharge?: Yes (1/24/2024 12:35 PM)  Care Management Interventions: No intervention needed (1/24/2024 12:35 PM)  Prescription Comments: START: cephalexin, mucinex, meclizine (1/24/2024 12:35 PM)  Is the patient taking all medications as directed (includes completed medication regime)?: Yes (1/24/2024 12:35 PM)  Care Management Interventions: Provided patient education (1/24/2024 12:35 PM)    Appointments  Does the patient have a primary care provider?: Yes (1/24/2024 12:35 PM)  Care Management Interventions: Verified appointment date/time/provider (1/24/2024 12:35 PM)  Care Management Interventions: Advised patient to keep appointment (1/24/2024 12:35 PM)    Self Management  Has home health visited  the patient within 72 hours of discharge?: Not applicable (1/24/2024 12:35 PM)    Patient Teaching  Does the patient have access to their discharge instructions?: Yes (1/24/2024 12:35 PM)  Care Management Interventions: Reviewed instructions with patient (1/24/2024 12:35 PM)  What is the patient's perception of their health status since discharge?: Improving (1/24/2024 12:35 PM)  Is the patient/caregiver able to teach back the hierarchy of who to call/visit for symptoms/problems? PCP, Specialist, Home Health nurse, Urgent Care, ED, 911: Yes (1/24/2024 12:35 PM)  Patient/Caregiver Education Comments: Patient reports she is feeling better. States she is not dizzy anymore or coughing. States she never had any symptoms of infections. Is aware to follow up with urology. Denies questions at this time. (1/24/2024 12:35 PM)    Scribe Attestation  By signing my name below, ITravis, [place]   attest that this documentation has been prepared under the direction and in the presence of Ismael Gan MD.    Patient was identified as a fall risk. Risk prevention instructions provided.

## 2024-01-26 NOTE — ASSESSMENT & PLAN NOTE
The nature of vertigo was discussed.  Patient was advised to take meclizine as needed. She was advised not to drive or operate machinery during the episodes or if the symptoms persists.  Follow-up with persistent all worsening symptoms or developing other neurological symptoms.  The nature of vertigo syndromes were discussed along with their usual course and treatment.  The risks and benefits of my recommendations, as well as other treatment options were discussed with the patient today.

## 2024-01-27 PROBLEM — N10 ACUTE PYELONEPHRITIS: Status: ACTIVE | Noted: 2024-01-26

## 2024-01-27 ASSESSMENT — ENCOUNTER SYMPTOMS
NUMBNESS: 0
WHEEZING: 0
CHILLS: 0
HEMATURIA: 0
TREMORS: 0
ABDOMINAL PAIN: 0
SHORTNESS OF BREATH: 0
FREQUENCY: 0
VOMITING: 0
RHINORRHEA: 0
COUGH: 0
PALPITATIONS: 0
SORE THROAT: 0
FEVER: 0
DYSURIA: 0
DIZZINESS: 0
HEADACHES: 0
DIARRHEA: 0
CONSTIPATION: 0

## 2024-01-29 LAB
APPEARANCE UR: ABNORMAL
BILIRUB UR STRIP.AUTO-MCNC: NEGATIVE MG/DL
COLOR UR: YELLOW
GLUCOSE UR STRIP.AUTO-MCNC: NEGATIVE MG/DL
HYALINE CASTS #/AREA URNS AUTO: ABNORMAL /LPF
KETONES UR STRIP.AUTO-MCNC: ABNORMAL MG/DL
LEUKOCYTE ESTERASE UR QL STRIP.AUTO: NEGATIVE
MUCOUS THREADS #/AREA URNS AUTO: ABNORMAL /LPF
NITRITE UR QL STRIP.AUTO: POSITIVE
PH UR STRIP.AUTO: 6 [PH]
PROT UR STRIP.AUTO-MCNC: NEGATIVE MG/DL
RBC # UR STRIP.AUTO: NEGATIVE /UL
RBC #/AREA URNS AUTO: ABNORMAL /HPF
SP GR UR STRIP.AUTO: 1.02
SQUAMOUS #/AREA URNS AUTO: ABNORMAL /HPF
UROBILINOGEN UR STRIP.AUTO-MCNC: <2 MG/DL
WBC #/AREA URNS AUTO: ABNORMAL /HPF

## 2024-01-30 LAB — BACTERIA UR CULT: NO GROWTH

## 2024-02-01 ENCOUNTER — PATIENT OUTREACH (OUTPATIENT)
Dept: PRIMARY CARE | Facility: CLINIC | Age: 84
End: 2024-02-01
Payer: MEDICARE

## 2024-02-01 NOTE — PROGRESS NOTES
Call regarding appt. with PCP on 1/26/24 after hospitalization.  At time of outreach call the patient feels as if their condition has improved since last visit. Patient states she is doing much better and even feels much better today than yesterday.  States her appointment with Dr. Gan went well. States she is having her hold off on seeing urologist until she has a few more tests.   Reviewed the PCP appointment with the pt and addressed any questions or concerns.

## 2024-02-09 ENCOUNTER — HOSPITAL ENCOUNTER (OUTPATIENT)
Dept: RADIOLOGY | Facility: HOSPITAL | Age: 84
Discharge: HOME | End: 2024-02-09
Payer: MEDICARE

## 2024-02-09 DIAGNOSIS — Z87.448 H/O PYELONEPHRITIS: ICD-10-CM

## 2024-02-09 DIAGNOSIS — N28.89 PELVICALIECTASIS: ICD-10-CM

## 2024-02-09 PROCEDURE — 76770 US EXAM ABDO BACK WALL COMP: CPT | Performed by: RADIOLOGY

## 2024-02-09 PROCEDURE — 76770 US EXAM ABDO BACK WALL COMP: CPT

## 2024-02-12 ENCOUNTER — TELEPHONE (OUTPATIENT)
Dept: PRIMARY CARE | Facility: CLINIC | Age: 84
End: 2024-02-12
Payer: MEDICARE

## 2024-02-12 DIAGNOSIS — N28.1 KIDNEY CYSTS: Primary | ICD-10-CM

## 2024-03-04 ENCOUNTER — PATIENT OUTREACH (OUTPATIENT)
Dept: PRIMARY CARE | Facility: CLINIC | Age: 84
End: 2024-03-04
Payer: MEDICARE

## 2024-03-04 NOTE — PROGRESS NOTES
Unable to reach patient for call back after patient's follow up appointment with PCP.   ANTONIOM with call back number for patient to call if needed   If no voicemail available call attempts x 2 were made to contact the patient to assist with any questions or concerns patient may have.

## 2024-03-23 ENCOUNTER — OFFICE VISIT (OUTPATIENT)
Dept: UROLOGY | Facility: CLINIC | Age: 84
End: 2024-03-23
Payer: MEDICARE

## 2024-03-23 VITALS
SYSTOLIC BLOOD PRESSURE: 163 MMHG | WEIGHT: 205.8 LBS | DIASTOLIC BLOOD PRESSURE: 89 MMHG | BODY MASS INDEX: 34.29 KG/M2 | HEART RATE: 73 BPM | HEIGHT: 65 IN | RESPIRATION RATE: 20 BRPM

## 2024-03-23 DIAGNOSIS — N28.1 RENAL CYST: Primary | ICD-10-CM

## 2024-03-23 DIAGNOSIS — N28.1 KIDNEY CYSTS: ICD-10-CM

## 2024-03-23 PROCEDURE — 99203 OFFICE O/P NEW LOW 30 MIN: CPT | Performed by: UROLOGY

## 2024-03-23 PROCEDURE — 1160F RVW MEDS BY RX/DR IN RCRD: CPT | Performed by: UROLOGY

## 2024-03-23 PROCEDURE — 1036F TOBACCO NON-USER: CPT | Performed by: UROLOGY

## 2024-03-23 PROCEDURE — 3079F DIAST BP 80-89 MM HG: CPT | Performed by: UROLOGY

## 2024-03-23 PROCEDURE — 1123F ACP DISCUSS/DSCN MKR DOCD: CPT | Performed by: UROLOGY

## 2024-03-23 PROCEDURE — 3077F SYST BP >= 140 MM HG: CPT | Performed by: UROLOGY

## 2024-03-23 PROCEDURE — 1159F MED LIST DOCD IN RCRD: CPT | Performed by: UROLOGY

## 2024-03-23 NOTE — PATIENT INSTRUCTIONS
It was very nice to meet you today.  We had a long discussion regarding your angiogram looking for a pulmonary embolus, CAT scan of the abdomen and pelvis and ultrasound of the kidneys, all showing cysts in the kidneys.  We had an in-depth review of simple cysts, complex cysts and kidney tumors.  At this time you have simple cysts in each kidney which we will continue to follow.  I will see you again in August.     This note was created with voice-recognition software and was not corrected for typographical or grammatical errors.

## 2024-03-23 NOTE — PROGRESS NOTES
Review of Systems  Patient denies pain today. Patient was recently released from a hospital stay for Bronchitis and currently on antibiotics with a lingering cough, Patient will wear a mask today for caution. Patient has no concerns with falling or safety. MG

## 2024-03-23 NOTE — LETTER
"March 23, 2024     Ismael Gan MD  [address] [address]    Patient: Rosana Hopkins   YOB: 1940   Date of Visit: 3/23/2024       Dear Dr. Ismael Gan MD:    Thank you for referring Rosana Hopkins to me for evaluation. Below are my notes for this consultation.  If you have questions, please do not hesitate to call me. I look forward to following your patient along with you.       Sincerely,     Jay Valdez MD      CC: No Recipients  ______________________________________________________________________________________    83-year-old white female referred by Dr. Ismael Gan for \"renal cyst\".  Has a history of pyelonephritis, acute UTIs and renal cysts.  Urinalysis showed no blood.  Renal ultrasound showed bilateral parapelvic cysts.  Urine culture no growth.  Patient is not employed.  No family history of breast or prostate cancer.  Patient has never smoked cigarettes.    March 23, 2024, patient arrives alone.  Her  waits in their car in the parking lot.  She has no urologic complaints.  I have reviewed the natural history of cystic disease in the kidneys.  We discussed simple cysts, complex cyst, and tumors.  At this time she has bilateral simple cysts and we will continue to follow.  She will return in August with a renal ultrasound.    PLAN:    1.  The patient will return in August 2024 with a renal ultrasound.    Physical Exam  Vitals and nursing note reviewed.   Constitutional:       Appearance: Normal appearance.   HENT:      Head: Normocephalic and atraumatic.   Pulmonary:      Effort: Pulmonary effort is normal.   Abdominal:      Palpations: Abdomen is soft.      Tenderness: There is no abdominal tenderness.   Musculoskeletal:         General: Normal range of motion.      Cervical back: Normal range of motion and neck supple.   Neurological:      General: No focal deficit present.      Mental Status: She is alert and oriented to person, place, " and time.   Psychiatric:         Mood and Affect: Mood normal.         Behavior: Behavior normal.        This note was created with voice-recognition software and was not corrected for typographical or grammatical errors.

## 2024-03-23 NOTE — PROGRESS NOTES
"83-year-old white female referred by Dr. Ismael Gan for \"renal cyst\".  Has a history of pyelonephritis, acute UTIs and renal cysts.  Urinalysis showed no blood.  Renal ultrasound showed bilateral parapelvic cysts.  Urine culture no growth.  Patient is not employed.  No family history of breast or prostate cancer.  Patient has never smoked cigarettes.    March 23, 2024, patient arrives alone.  Her  waits in their car in the parking lot.  She has no urologic complaints.  I have reviewed the natural history of cystic disease in the kidneys.  We discussed simple cysts, complex cyst, and tumors.  At this time she has bilateral simple cysts and we will continue to follow.  She will return in August with a renal ultrasound.    PLAN:    1.  The patient will return in August 2024 with a renal ultrasound.    Physical Exam  Vitals and nursing note reviewed.   Constitutional:       Appearance: Normal appearance.   HENT:      Head: Normocephalic and atraumatic.   Pulmonary:      Effort: Pulmonary effort is normal.   Abdominal:      Palpations: Abdomen is soft.      Tenderness: There is no abdominal tenderness.   Musculoskeletal:         General: Normal range of motion.      Cervical back: Normal range of motion and neck supple.   Neurological:      General: No focal deficit present.      Mental Status: She is alert and oriented to person, place, and time.   Psychiatric:         Mood and Affect: Mood normal.         Behavior: Behavior normal.        This note was created with voice-recognition software and was not corrected for typographical or grammatical errors.  "

## 2024-04-05 DIAGNOSIS — I10 ESSENTIAL HYPERTENSION: ICD-10-CM

## 2024-04-05 RX ORDER — LOSARTAN POTASSIUM AND HYDROCHLOROTHIAZIDE 25; 100 MG/1; MG/1
1 TABLET ORAL DAILY
Qty: 90 TABLET | Refills: 1 | Status: SHIPPED | OUTPATIENT
Start: 2024-04-05 | End: 2024-04-15 | Stop reason: SDUPTHER

## 2024-04-05 NOTE — TELEPHONE ENCOUNTER
Last Rx 10/01/2023 qty 90 with 1 rf     Name:  Rosana Hopkins  :  [id number]  Medication Name:  LOSARTAN 90 DAY SUPPLY    Specific Pharmacy location:  [facility] [place]   Date of last appointment: 24  Date of next appointment: 4-15-24  Best number to reach patient:  [phone]

## 2024-04-10 ENCOUNTER — LAB (OUTPATIENT)
Dept: LAB | Facility: LAB | Age: 84
End: 2024-04-10
Payer: MEDICARE

## 2024-04-10 DIAGNOSIS — I10 ESSENTIAL HYPERTENSION: ICD-10-CM

## 2024-04-10 DIAGNOSIS — E03.9 ACQUIRED HYPOTHYROIDISM: ICD-10-CM

## 2024-04-10 DIAGNOSIS — E78.2 MIXED HYPERLIPIDEMIA: ICD-10-CM

## 2024-04-10 LAB
ALBUMIN SERPL BCP-MCNC: 3.6 G/DL (ref 3.4–5)
ALP SERPL-CCNC: 53 U/L (ref 33–136)
ALT SERPL W P-5'-P-CCNC: 11 U/L (ref 7–45)
ANION GAP SERPL CALC-SCNC: 11 MMOL/L (ref 10–20)
AST SERPL W P-5'-P-CCNC: 14 U/L (ref 9–39)
BASOPHILS # BLD AUTO: 0.05 X10*3/UL (ref 0–0.1)
BASOPHILS NFR BLD AUTO: 0.8 %
BILIRUB SERPL-MCNC: 0.6 MG/DL (ref 0–1.2)
BUN SERPL-MCNC: 19 MG/DL (ref 6–23)
CALCIUM SERPL-MCNC: 9.2 MG/DL (ref 8.6–10.3)
CHLORIDE SERPL-SCNC: 106 MMOL/L (ref 98–107)
CHOLEST SERPL-MCNC: 158 MG/DL (ref 0–199)
CHOLESTEROL/HDL RATIO: 2.7
CO2 SERPL-SCNC: 27 MMOL/L (ref 21–32)
CREAT SERPL-MCNC: 0.76 MG/DL (ref 0.5–1.05)
EGFRCR SERPLBLD CKD-EPI 2021: 79 ML/MIN/1.73M*2
EOSINOPHIL # BLD AUTO: 0.17 X10*3/UL (ref 0–0.4)
EOSINOPHIL NFR BLD AUTO: 2.8 %
ERYTHROCYTE [DISTWIDTH] IN BLOOD BY AUTOMATED COUNT: 13.9 % (ref 11.5–14.5)
GLUCOSE SERPL-MCNC: 83 MG/DL (ref 74–99)
HCT VFR BLD AUTO: 40.1 % (ref 36–46)
HDLC SERPL-MCNC: 59.6 MG/DL
HGB BLD-MCNC: 12.8 G/DL (ref 12–16)
IMM GRANULOCYTES # BLD AUTO: 0.01 X10*3/UL (ref 0–0.5)
IMM GRANULOCYTES NFR BLD AUTO: 0.2 % (ref 0–0.9)
LDLC SERPL CALC-MCNC: 81 MG/DL
LYMPHOCYTES # BLD AUTO: 1.64 X10*3/UL (ref 0.8–3)
LYMPHOCYTES NFR BLD AUTO: 27.3 %
MCH RBC QN AUTO: 29.2 PG (ref 26–34)
MCHC RBC AUTO-ENTMCNC: 31.9 G/DL (ref 32–36)
MCV RBC AUTO: 92 FL (ref 80–100)
MONOCYTES # BLD AUTO: 0.46 X10*3/UL (ref 0.05–0.8)
MONOCYTES NFR BLD AUTO: 7.7 %
NEUTROPHILS # BLD AUTO: 3.68 X10*3/UL (ref 1.6–5.5)
NEUTROPHILS NFR BLD AUTO: 61.2 %
NON HDL CHOLESTEROL: 98 MG/DL (ref 0–149)
NRBC BLD-RTO: 0 /100 WBCS (ref 0–0)
PLATELET # BLD AUTO: 238 X10*3/UL (ref 150–450)
POTASSIUM SERPL-SCNC: 4.4 MMOL/L (ref 3.5–5.3)
PROT SERPL-MCNC: 6.1 G/DL (ref 6.4–8.2)
RBC # BLD AUTO: 4.38 X10*6/UL (ref 4–5.2)
SODIUM SERPL-SCNC: 140 MMOL/L (ref 136–145)
T4 FREE SERPL-MCNC: 2.57 NG/DL (ref 0.61–1.12)
TRIGL SERPL-MCNC: 89 MG/DL (ref 0–149)
TSH SERPL-ACNC: 2.51 MIU/L (ref 0.44–3.98)
VLDL: 18 MG/DL (ref 0–40)
WBC # BLD AUTO: 6 X10*3/UL (ref 4.4–11.3)

## 2024-04-10 PROCEDURE — 80053 COMPREHEN METABOLIC PANEL: CPT

## 2024-04-10 PROCEDURE — 84443 ASSAY THYROID STIM HORMONE: CPT

## 2024-04-10 PROCEDURE — 36415 COLL VENOUS BLD VENIPUNCTURE: CPT

## 2024-04-10 PROCEDURE — 85025 COMPLETE CBC W/AUTO DIFF WBC: CPT

## 2024-04-10 PROCEDURE — 80061 LIPID PANEL: CPT

## 2024-04-10 PROCEDURE — 84439 ASSAY OF FREE THYROXINE: CPT

## 2024-04-15 ENCOUNTER — OFFICE VISIT (OUTPATIENT)
Dept: PRIMARY CARE | Facility: CLINIC | Age: 84
End: 2024-04-15
Payer: MEDICARE

## 2024-04-15 VITALS
WEIGHT: 215 LBS | DIASTOLIC BLOOD PRESSURE: 80 MMHG | RESPIRATION RATE: 18 BRPM | HEIGHT: 65 IN | BODY MASS INDEX: 35.82 KG/M2 | HEART RATE: 80 BPM | TEMPERATURE: 97 F | SYSTOLIC BLOOD PRESSURE: 128 MMHG

## 2024-04-15 DIAGNOSIS — J20.9 ACUTE BRONCHITIS, UNSPECIFIED ORGANISM: ICD-10-CM

## 2024-04-15 DIAGNOSIS — I10 ESSENTIAL HYPERTENSION: Primary | ICD-10-CM

## 2024-04-15 DIAGNOSIS — E03.9 ACQUIRED HYPOTHYROIDISM: ICD-10-CM

## 2024-04-15 DIAGNOSIS — E66.01 CLASS 2 SEVERE OBESITY DUE TO EXCESS CALORIES WITH SERIOUS COMORBIDITY AND BODY MASS INDEX (BMI) OF 35.0 TO 35.9 IN ADULT (MULTI): ICD-10-CM

## 2024-04-15 DIAGNOSIS — E78.2 MIXED HYPERLIPIDEMIA: ICD-10-CM

## 2024-04-15 PROCEDURE — 1160F RVW MEDS BY RX/DR IN RCRD: CPT | Performed by: FAMILY MEDICINE

## 2024-04-15 PROCEDURE — 1036F TOBACCO NON-USER: CPT | Performed by: FAMILY MEDICINE

## 2024-04-15 PROCEDURE — 1159F MED LIST DOCD IN RCRD: CPT | Performed by: FAMILY MEDICINE

## 2024-04-15 PROCEDURE — 99214 OFFICE O/P EST MOD 30 MIN: CPT | Performed by: FAMILY MEDICINE

## 2024-04-15 PROCEDURE — 3078F DIAST BP <80 MM HG: CPT | Performed by: FAMILY MEDICINE

## 2024-04-15 PROCEDURE — 1123F ACP DISCUSS/DSCN MKR DOCD: CPT | Performed by: FAMILY MEDICINE

## 2024-04-15 PROCEDURE — 3074F SYST BP LT 130 MM HG: CPT | Performed by: FAMILY MEDICINE

## 2024-04-15 RX ORDER — BENZONATATE 200 MG/1
200 CAPSULE ORAL 3 TIMES DAILY PRN
Qty: 30 CAPSULE | Refills: 0 | Status: SHIPPED | OUTPATIENT
Start: 2024-04-15 | End: 2024-04-25

## 2024-04-15 RX ORDER — LOSARTAN POTASSIUM AND HYDROCHLOROTHIAZIDE 25; 100 MG/1; MG/1
1 TABLET ORAL DAILY
Qty: 90 TABLET | Refills: 1 | Status: SHIPPED | OUTPATIENT
Start: 2024-04-15

## 2024-04-15 RX ORDER — LEVOTHYROXINE SODIUM 75 UG/1
75 TABLET ORAL DAILY
Qty: 90 TABLET | Refills: 1 | Status: SHIPPED | OUTPATIENT
Start: 2024-04-15

## 2024-04-15 RX ORDER — AZITHROMYCIN 250 MG/1
TABLET, FILM COATED ORAL DAILY
Qty: 6 TABLET | Refills: 0 | Status: SHIPPED | OUTPATIENT
Start: 2024-04-15 | End: 2024-04-20

## 2024-04-15 RX ORDER — CHOLESTYRAMINE 4 G/5.5G
POWDER, FOR SUSPENSION ORAL
Qty: 693 G | Refills: 2 | Status: SHIPPED | OUTPATIENT
Start: 2024-04-15

## 2024-04-15 ASSESSMENT — ENCOUNTER SYMPTOMS
PALPITATIONS: 0
COUGH: 1
DYSURIA: 0
HEADACHES: 0
ABDOMINAL PAIN: 0
HEMATURIA: 0
DIZZINESS: 0
FEVER: 0
FREQUENCY: 0
CHILLS: 0
WHEEZING: 0
RHINORRHEA: 1
CONSTIPATION: 0
SWEATS: 0
TREMORS: 0
SORE THROAT: 0
HEMOPTYSIS: 0
VOMITING: 0
SHORTNESS OF BREATH: 0
NUMBNESS: 0
DIARRHEA: 0
CHEST TIGHTNESS: 0

## 2024-04-15 NOTE — ASSESSMENT & PLAN NOTE
We will start her on Azithromycin.  Recommend liberal oral fluid intake.  Call if symptoms fail to improve as expected.    Follow-up if persistent or worsening symptoms otherwise when necessary.

## 2024-04-15 NOTE — PROGRESS NOTES
"Subjective   Patient ID: Rosana Hopkins is a 83 y.o. female who presents for Follow-up (Hypertension, Hyperlipidemia, Hypothyroidism and labs).    Patient is here for follow-up on hypertension and hyperlipidemia. She has no chest pain, dyspnea, exertional chest pressure/discomfort, near-syncope, orthopnea, palpitations, paroxysmal nocturnal dyspnea, and syncope. Taking her medication regularly with no side effects.    She presents for follow up of hypothyroidism. Current symptoms: none. She has no change in energy level, diarrhea, heat / cold intolerance, nervousness, palpitations, or weight changes.     Cough  This is a recurrent problem. The current episode started 1 to 4 weeks ago. The problem has been unchanged. Associated symptoms include rhinorrhea. Pertinent negatives include no chest pain, chills, ear pain, fever, headaches, hemoptysis, sore throat, shortness of breath, sweats or wheezing. Associated symptoms comments: sneezing. Nothing aggravates the symptoms.      Review of Systems   Constitutional:  Negative for chills and fever.   HENT:  Positive for rhinorrhea and sneezing. Negative for congestion, ear pain, nosebleeds and sore throat.    Respiratory:  Positive for cough. Negative for hemoptysis, chest tightness, shortness of breath and wheezing.    Cardiovascular:  Negative for chest pain, palpitations and leg swelling.   Gastrointestinal:  Negative for abdominal pain, constipation, diarrhea and vomiting.   Genitourinary:  Negative for dysuria, frequency and hematuria.   Neurological:  Negative for dizziness, tremors, numbness and headaches.       Objective   /80   Pulse 80   Temp 36.1 °C (97 °F)   Resp 18   Ht 1.651 m (5' 5\")   Wt 97.5 kg (215 lb)   BMI 35.78 kg/m²     Physical Exam  Constitutional:       General: She is not in acute distress.     Appearance: Normal appearance.   HENT:      Head: Normocephalic and atraumatic.      Mouth/Throat:      Mouth: Mucous membranes are moist.    "   Pharynx: Oropharynx is clear. No oropharyngeal exudate or posterior oropharyngeal erythema.   Eyes:      General: No scleral icterus.     Extraocular Movements: Extraocular movements intact.      Pupils: Pupils are equal, round, and reactive to light.   Cardiovascular:      Rate and Rhythm: Normal rate and regular rhythm.      Pulses: Normal pulses.      Heart sounds: No murmur heard.     No friction rub. No gallop.   Pulmonary:      Effort: Pulmonary effort is normal.      Breath sounds: No wheezing, rhonchi or rales.   Skin:     General: Skin is warm.      Coloration: Skin is not jaundiced or pale.      Findings: No erythema or rash.   Neurological:      General: No focal deficit present.      Mental Status: She is alert and oriented to person, place, and time.      Cranial Nerves: No cranial nerve deficit.      Sensory: No sensory deficit.      Coordination: Coordination normal.      Gait: Gait normal.         Lab on 04/10/2024   Component Date Value Ref Range Status    Glucose 04/10/2024 83  74 - 99 mg/dL Final    Sodium 04/10/2024 140  136 - 145 mmol/L Final    Potassium 04/10/2024 4.4  3.5 - 5.3 mmol/L Final    Chloride 04/10/2024 106  98 - 107 mmol/L Final    Bicarbonate 04/10/2024 27  21 - 32 mmol/L Final    Anion Gap 04/10/2024 11  10 - 20 mmol/L Final    Urea Nitrogen 04/10/2024 19  6 - 23 mg/dL Final    Creatinine 04/10/2024 0.76  0.50 - 1.05 mg/dL Final    eGFR 04/10/2024 79  >60 mL/min/1.73m*2 Final    Calculations of estimated GFR are performed using the 2021 CKD-EPI Study Refit equation without the race variable for the IDMS-Traceable creatinine methods.  https://jasn.asnjournals.org/content/early/2021/09/22/ASN.2720338409    Calcium 04/10/2024 9.2  8.6 - 10.3 mg/dL Final    Albumin 04/10/2024 3.6  3.4 - 5.0 g/dL Final    Alkaline Phosphatase 04/10/2024 53  33 - 136 U/L Final    Total Protein 04/10/2024 6.1 (L)  6.4 - 8.2 g/dL Final    AST 04/10/2024 14  9 - 39 U/L Final    Bilirubin, Total  04/10/2024 0.6  0.0 - 1.2 mg/dL Final    ALT 04/10/2024 11  7 - 45 U/L Final    Patients treated with Sulfasalazine may generate falsely decreased results for ALT.    Cholesterol 04/10/2024 158  0 - 199 mg/dL Final          Age      Desirable   Borderline High   High     0-19 Y     0 - 169       170 - 199     >/= 200    20-24 Y     0 - 189       190 - 224     >/= 225         >24 Y     0 - 199       200 - 239     >/= 240   **All ranges are based on fasting samples. Specific   therapeutic targets will vary based on patient-specific   cardiac risk.    Pediatric guidelines reference:Pediatrics 2011, 128(S5).Adult guidelines reference: NCEP ATPIII Guidelines,ANURAG 2001, 258:2486-97    Venipuncture immediately after or during the administration of Metamizole may lead to falsely low results. Testing should be performed immediately prior to Metamizole dosing.    HDL-Cholesterol 04/10/2024 59.6  mg/dL Final      Age       Very Low   Low     Normal    High    0-19 Y    < 35      < 40     40-45     ----  20-24 Y    ----     < 40      >45      ----        >24 Y      ----     < 40     40-60      >60      Cholesterol/HDL Ratio 04/10/2024 2.7   Final      Ref Values  Desirable  < 3.4  High Risk  > 5.0    LDL Calculated 04/10/2024 81  <=99 mg/dL Final                                Near   Borderline      AGE      Desirable  Optimal    High     High     Very High     0-19 Y     0 - 109     ---    110-129   >/= 130     ----    20-24 Y     0 - 119     ---    120-159   >/= 160     ----      >24 Y     0 -  99   100-129  130-159   160-189     >/=190      VLDL 04/10/2024 18  0 - 40 mg/dL Final    Triglycerides 04/10/2024 89  0 - 149 mg/dL Final       Age         Desirable   Borderline High   High     Very High   0 D-90 D    19 - 174         ----         ----        ----  91 D- 9 Y     0 -  74        75 -  99     >/= 100      ----    10-19 Y     0 -  89        90 - 129     >/= 130      ----    20-24 Y     0 - 114       115 - 149     >/= 150       ----         >24 Y     0 - 149       150 - 199    200- 499    >/= 500    Venipuncture immediately after or during the administration of Metamizole may lead to falsely low results. Testing should be performed immediately prior to Metamizole dosing.    Non HDL Cholesterol 04/10/2024 98  0 - 149 mg/dL Final          Age       Desirable   Borderline High   High     Very High     0-19 Y     0 - 119       120 - 144     >/= 145    >/= 160    20-24 Y     0 - 149       150 - 189     >/= 190      ----         >24 Y    30 mg/dL above LDL Cholesterol goal      Thyroid Stimulating Hormone 04/10/2024 2.51  0.44 - 3.98 mIU/L Final    Thyroxine, Free 04/10/2024 2.57 (H)  0.61 - 1.12 ng/dL Final       Assessment/Plan   Problem List Items Addressed This Visit       Acquired hypothyroidism     Well-controlled, continue current medications and management.         Relevant Medications    levothyroxine (Synthroid, Levoxyl) 75 mcg tablet    Other Relevant Orders    Thyroid Stimulating Hormone    Thyroxine, Free    Follow Up In [facility] - PCP - Established    Acute bronchitis, unspecified     We will start her on Azithromycin.  Recommend liberal oral fluid intake.  Call if symptoms fail to improve as expected.    Follow-up if persistent or worsening symptoms otherwise when necessary.         Relevant Medications    azithromycin (Zithromax) 250 mg tablet    benzonatate (Tessalon) 200 mg capsule    Class 2 severe obesity due to excess calories with serious comorbidity and body mass index (BMI) of 35.0 to 35.9 in adult (Multi)     Continue decrease calorie diet and not more than 1500 calorie per day diet and low-fat diet.  Continue with regular exercise program.  We advised exercise at least 5 days a week for at least 45 minutes and also a minimum of 10,000 steps a day.  The detrimental effects of obesity on health were discussed.         Essential hypertension - Primary     Well-controlled, continue current medications and  "management.  Dietary sodium restriction.  Regular aerobic exercise program is recommended to help achieve and maintain normal body weight, fitness and improve lipid balance. .  Dietary changes: Increase soluble fiber  Plant sterols 2grams per day (e.g. Benecol)  Reduce saturated fat, \"trans\" monounsaturated fatty acids, and cholesterol         Relevant Medications    losartan-hydrochlorothiazide (Hyzaar) 100-25 mg tablet    Other Relevant Orders    Comprehensive Metabolic Panel    Lipid Panel    Follow Up In [facility] - PCP - Established    Mixed hyperlipidemia     The nature of cardiac risk has been fully discussed with this patient. Discussed cardiovascular risk analysis and appropriate diet with the need for lifelong measures to reduce the risk. A regular exercise program is recommended to help achieve and maintain normal body weight, fitness and improve lipid balance. Patient education provided. They understand and agree with this course of treatment. They will return with new or worsening symptoms. Patient instructed to remain current with appropriate annual health maintenance.          Relevant Medications    cholestyramine-aspartame (Prevalite) 4 gram packet    Other Relevant Orders    Comprehensive Metabolic Panel    Lipid Panel    Follow Up In [facility] - PCP - Established     Scribe Attestation  By signing my name below, ITravis Scribe   attest that this documentation has been prepared under the direction and in the presence of Ismael Gan MD.  "

## 2024-04-15 NOTE — PATIENT INSTRUCTIONS
BMI was above normal measurement. Current weight: 97.5 kg (215 lb)  Weight change since last visit (-) denotes wt loss 9.2 lbs   Weight loss needed to achieve BMI 25: 65.1 Lbs  Weight loss needed to achieve BMI 30: 35.1 Lbs  Advised to Increase physical activity.

## 2024-04-17 ENCOUNTER — PATIENT OUTREACH (OUTPATIENT)
Dept: PRIMARY CARE | Facility: CLINIC | Age: 84
End: 2024-04-17
Payer: MEDICARE

## 2024-04-17 NOTE — PROGRESS NOTES
Unsuccessful outreach to this patient for the 90 day post discharge outreach. Left a voice-mail message including my direct call back number for the patient to call regarding any questions or concerns.

## 2024-06-03 ENCOUNTER — TELEPHONE (OUTPATIENT)
Dept: PRIMARY CARE | Facility: CLINIC | Age: 84
End: 2024-06-03
Payer: MEDICARE

## 2024-06-03 NOTE — TELEPHONE ENCOUNTER
Patient called stating that she pulled a muscle in her back that is not allowing her to lift her right arm. She also has a vein that is turning blue going down her leg that appeared Sunday. Patient was advised that a message will be sent to the doctor but if pain persist that she may want to consider a ER. Patient was told that a message will be sent to [facility] staff for contact   Patient understood at time of call   Please advise

## 2024-07-29 ENCOUNTER — HOSPITAL ENCOUNTER (OUTPATIENT)
Dept: RADIOLOGY | Facility: HOSPITAL | Age: 84
Discharge: HOME | End: 2024-07-29
Payer: MEDICARE

## 2024-07-29 DIAGNOSIS — N28.1 RENAL CYST: ICD-10-CM

## 2024-07-29 DIAGNOSIS — N28.1 KIDNEY CYSTS: ICD-10-CM

## 2024-07-29 PROCEDURE — 76770 US EXAM ABDO BACK WALL COMP: CPT | Performed by: RADIOLOGY

## 2024-07-29 PROCEDURE — 76770 US EXAM ABDO BACK WALL COMP: CPT

## 2024-08-19 ENCOUNTER — APPOINTMENT (OUTPATIENT)
Dept: UROLOGY | Facility: CLINIC | Age: 84
End: 2024-08-19
Payer: MEDICARE

## 2024-08-30 DIAGNOSIS — I10 ESSENTIAL HYPERTENSION: ICD-10-CM

## 2024-08-30 DIAGNOSIS — E78.2 MIXED HYPERLIPIDEMIA: ICD-10-CM

## 2024-08-30 DIAGNOSIS — E03.9 ACQUIRED HYPOTHYROIDISM: ICD-10-CM

## 2024-08-30 RX ORDER — LOSARTAN POTASSIUM AND HYDROCHLOROTHIAZIDE 25; 100 MG/1; MG/1
1 TABLET ORAL DAILY
Qty: 90 TABLET | Refills: 1 | Status: SHIPPED | OUTPATIENT
Start: 2024-08-30

## 2024-08-30 RX ORDER — CHOLESTYRAMINE 4 G/5.5G
POWDER, FOR SUSPENSION ORAL
Qty: 693 G | Refills: 2 | Status: SHIPPED | OUTPATIENT
Start: 2024-08-30

## 2024-08-30 RX ORDER — LEVOTHYROXINE SODIUM 75 UG/1
75 TABLET ORAL DAILY
Qty: 90 TABLET | Refills: 1 | Status: SHIPPED | OUTPATIENT
Start: 2024-08-30

## 2024-08-30 NOTE — TELEPHONE ENCOUNTER
Rx Refill Request     Name: Rosana Hopkins  :  1940   Medication Name:  LOSARTAN 100/25    LEVOTHYROXINE 75MCQ  PREVALITE 4GM  Specific Pharmacy location:  [facility]  Date of last appointment:  4/15/2024   Date of next appointment:  10/10/2024   Best number to reach patient:  [phone]

## 2024-10-04 ENCOUNTER — APPOINTMENT (OUTPATIENT)
Dept: UROLOGY | Facility: CLINIC | Age: 84
End: 2024-10-04
Payer: MEDICARE

## 2024-10-04 ENCOUNTER — LAB (OUTPATIENT)
Dept: LAB | Facility: LAB | Age: 84
End: 2024-10-04
Payer: MEDICARE

## 2024-10-04 VITALS
HEART RATE: 86 BPM | SYSTOLIC BLOOD PRESSURE: 131 MMHG | BODY MASS INDEX: 35.56 KG/M2 | HEIGHT: 65 IN | WEIGHT: 213.41 LBS | DIASTOLIC BLOOD PRESSURE: 73 MMHG | RESPIRATION RATE: 18 BRPM

## 2024-10-04 DIAGNOSIS — I10 ESSENTIAL HYPERTENSION: ICD-10-CM

## 2024-10-04 DIAGNOSIS — E78.2 MIXED HYPERLIPIDEMIA: ICD-10-CM

## 2024-10-04 DIAGNOSIS — E03.9 ACQUIRED HYPOTHYROIDISM: ICD-10-CM

## 2024-10-04 DIAGNOSIS — N28.1 RENAL CYST: Primary | ICD-10-CM

## 2024-10-04 LAB
ALBUMIN SERPL BCP-MCNC: 4.2 G/DL (ref 3.4–5)
ALP SERPL-CCNC: 56 U/L (ref 33–136)
ALT SERPL W P-5'-P-CCNC: 16 U/L (ref 7–45)
ANION GAP SERPL CALC-SCNC: 13 MMOL/L (ref 10–20)
AST SERPL W P-5'-P-CCNC: 17 U/L (ref 9–39)
BILIRUB SERPL-MCNC: 0.6 MG/DL (ref 0–1.2)
BUN SERPL-MCNC: 18 MG/DL (ref 6–23)
CALCIUM SERPL-MCNC: 9.7 MG/DL (ref 8.6–10.3)
CHLORIDE SERPL-SCNC: 103 MMOL/L (ref 98–107)
CHOLEST SERPL-MCNC: 196 MG/DL (ref 0–199)
CHOLESTEROL/HDL RATIO: 3.7
CO2 SERPL-SCNC: 29 MMOL/L (ref 21–32)
CREAT SERPL-MCNC: 0.75 MG/DL (ref 0.5–1.05)
EGFRCR SERPLBLD CKD-EPI 2021: 80 ML/MIN/1.73M*2
GLUCOSE SERPL-MCNC: 91 MG/DL (ref 74–99)
HDLC SERPL-MCNC: 53.4 MG/DL
LDLC SERPL CALC-MCNC: 115 MG/DL
NON HDL CHOLESTEROL: 143 MG/DL (ref 0–149)
POTASSIUM SERPL-SCNC: 4.6 MMOL/L (ref 3.5–5.3)
PROT SERPL-MCNC: 6.9 G/DL (ref 6.4–8.2)
SODIUM SERPL-SCNC: 140 MMOL/L (ref 136–145)
T4 FREE SERPL-MCNC: 2.48 NG/DL (ref 0.61–1.12)
TRIGL SERPL-MCNC: 136 MG/DL (ref 0–149)
TSH SERPL-ACNC: 2.56 MIU/L (ref 0.44–3.98)
VLDL: 27 MG/DL (ref 0–40)

## 2024-10-04 PROCEDURE — 99214 OFFICE O/P EST MOD 30 MIN: CPT | Performed by: UROLOGY

## 2024-10-04 PROCEDURE — 3075F SYST BP GE 130 - 139MM HG: CPT | Performed by: UROLOGY

## 2024-10-04 PROCEDURE — 84443 ASSAY THYROID STIM HORMONE: CPT

## 2024-10-04 PROCEDURE — 1036F TOBACCO NON-USER: CPT | Performed by: UROLOGY

## 2024-10-04 PROCEDURE — 1123F ACP DISCUSS/DSCN MKR DOCD: CPT | Performed by: UROLOGY

## 2024-10-04 PROCEDURE — 3078F DIAST BP <80 MM HG: CPT | Performed by: UROLOGY

## 2024-10-04 PROCEDURE — 80053 COMPREHEN METABOLIC PANEL: CPT

## 2024-10-04 PROCEDURE — 1160F RVW MEDS BY RX/DR IN RCRD: CPT | Performed by: UROLOGY

## 2024-10-04 PROCEDURE — 36415 COLL VENOUS BLD VENIPUNCTURE: CPT

## 2024-10-04 PROCEDURE — 80061 LIPID PANEL: CPT

## 2024-10-04 PROCEDURE — 1125F AMNT PAIN NOTED PAIN PRSNT: CPT | Performed by: UROLOGY

## 2024-10-04 PROCEDURE — 1159F MED LIST DOCD IN RCRD: CPT | Performed by: UROLOGY

## 2024-10-04 PROCEDURE — 84439 ASSAY OF FREE THYROXINE: CPT

## 2024-10-04 ASSESSMENT — ENCOUNTER SYMPTOMS
FREQUENCY: 0
DYSURIA: 0
DIFFICULTY URINATING: 0
HEMATURIA: 0

## 2024-10-04 ASSESSMENT — PAIN SCALES - GENERAL: PAINLEVEL: 2

## 2024-10-04 NOTE — PROGRESS NOTES
"  84 year-old white female referred by Dr. Ismael Gan for \"renal cyst\".  Has a history of pyelonephritis, acute UTIs and renal cysts.  Urinalysis showed no blood.  Renal ultrasound showed bilateral parapelvic cysts.  Urine culture no growth.  Patient is not employed.  No family history of breast or prostate cancer.  Patient has never smoked cigarettes.     March 23, 2024, patient arrives alone.  Her  waits in their car in the parking lot.  She has no urologic complaints.  I have reviewed the natural history of cystic disease in the kidneys.  We discussed simple cysts, complex cysts, and tumors.  At this time she has bilateral simple cysts and we will continue to follow.  She will return in August with a renal ultrasound.    October 4, 2024, patient arrives alone.  She has no new urinary complaints.  Her renal ultrasound shows bilateral simple cysts, unchanged.  We will continue to follow.  She will return in August.     PLAN:     1.  The patient will return in August 2025 with a renal ultrasound.     Physical Exam  Vitals and nursing note reviewed.   Constitutional:       Appearance: Normal appearance.   HENT:      Head: Normocephalic and atraumatic.   Pulmonary:      Effort: Pulmonary effort is normal.   Abdominal:      Palpations: Abdomen is soft.      Tenderness: There is no abdominal tenderness.   Musculoskeletal:         General: Normal range of motion.      Cervical back: Normal range of motion and neck supple.   Neurological:      General: No focal deficit present.      Mental Status: She is alert and oriented to person, place, and time.   Psychiatric:         Mood and Affect: Mood normal.         Behavior: Behavior normal.        This note was created with voice-recognition software and was not corrected for typographical or grammatical errors.        "

## 2024-10-04 NOTE — LETTER
"October 4, 2024     Ismael Gan MD  [address] [address]    Patient: Rosana Hopkins   YOB: 1940   Date of Visit: 10/4/2024       Dear Dr. Ismael Gan MD:    Thank you for referring Rosana Hopkins to me for evaluation. Below are my notes for this consultation.  If you have questions, please do not hesitate to call me. I look forward to following your patient along with you.       Sincerely,     Jay Valdez MD      CC: No Recipients  ______________________________________________________________________________________      84 year-old white female referred by Dr. Ismael Gan for \"renal cyst\".  Has a history of pyelonephritis, acute UTIs and renal cysts.  Urinalysis showed no blood.  Renal ultrasound showed bilateral parapelvic cysts.  Urine culture no growth.  Patient is not employed.  No family history of breast or prostate cancer.  Patient has never smoked cigarettes.     March 23, 2024, patient arrives alone.  Her  waits in their car in the parking lot.  She has no urologic complaints.  I have reviewed the natural history of cystic disease in the kidneys.  We discussed simple cysts, complex cysts, and tumors.  At this time she has bilateral simple cysts and we will continue to follow.  She will return in August with a renal ultrasound.    October 4, 2024, patient arrives alone.  She has no new urinary complaints.  Her renal ultrasound shows bilateral simple cysts, unchanged.  We will continue to follow.  She will return in August.     PLAN:     1.  The patient will return in August 2025 with a renal ultrasound.     Physical Exam  Vitals and nursing note reviewed.   Constitutional:       Appearance: Normal appearance.   HENT:      Head: Normocephalic and atraumatic.   Pulmonary:      Effort: Pulmonary effort is normal.   Abdominal:      Palpations: Abdomen is soft.      Tenderness: There is no abdominal tenderness.   Musculoskeletal:         " General: Normal range of motion.      Cervical back: Normal range of motion and neck supple.   Neurological:      General: No focal deficit present.      Mental Status: She is alert and oriented to person, place, and time.   Psychiatric:         Mood and Affect: Mood normal.         Behavior: Behavior normal.        This note was created with voice-recognition software and was not corrected for typographical or grammatical errors.

## 2024-10-04 NOTE — PATIENT INSTRUCTIONS
It was very nice to see you again today.  We had a long discussion regarding your angiogram looking for a pulmonary embolus, CAT scan of the abdomen and pelvis and ultrasound of the kidneys, all showing cysts in the kidneys.  We had an in-depth review of simple cysts, complex cysts and kidney tumors.  We have repeated your ultrasound and at this time you have simple cysts in each kidney which we will continue to follow.  I will see you again in August.      This note was created with voice-recognition software and was not corrected for typographical or grammatical errors.

## 2024-10-04 NOTE — PROGRESS NOTES
Patient denies any pain today. Patient has not had any recent surgeries or hospital admits. Patient denies any concerns about falling or safety. Patient has occasional leakage if holding her bladder too long. CV    Review of Systems   Genitourinary:  Negative for difficulty urinating, dysuria, enuresis, frequency, hematuria and urgency.   All other systems reviewed and are negative.

## 2024-10-10 ENCOUNTER — APPOINTMENT (OUTPATIENT)
Dept: PRIMARY CARE | Facility: CLINIC | Age: 84
End: 2024-10-10
Payer: MEDICARE

## 2024-10-10 VITALS
WEIGHT: 213.2 LBS | DIASTOLIC BLOOD PRESSURE: 90 MMHG | BODY MASS INDEX: 35.52 KG/M2 | RESPIRATION RATE: 18 BRPM | HEART RATE: 71 BPM | SYSTOLIC BLOOD PRESSURE: 146 MMHG | TEMPERATURE: 97.6 F | OXYGEN SATURATION: 96 % | HEIGHT: 65 IN

## 2024-10-10 DIAGNOSIS — E78.2 MIXED HYPERLIPIDEMIA: ICD-10-CM

## 2024-10-10 DIAGNOSIS — I10 ESSENTIAL HYPERTENSION: ICD-10-CM

## 2024-10-10 DIAGNOSIS — Z00.00 ANNUAL PHYSICAL EXAM: Primary | ICD-10-CM

## 2024-10-10 DIAGNOSIS — M71.22 SYNOVIAL CYST OF LEFT POPLITEAL SPACE: ICD-10-CM

## 2024-10-10 DIAGNOSIS — E66.812 CLASS 2 SEVERE OBESITY DUE TO EXCESS CALORIES WITH SERIOUS COMORBIDITY AND BODY MASS INDEX (BMI) OF 35.0 TO 35.9 IN ADULT: ICD-10-CM

## 2024-10-10 DIAGNOSIS — E03.9 ACQUIRED HYPOTHYROIDISM: ICD-10-CM

## 2024-10-10 DIAGNOSIS — Z23 NEED FOR INFLUENZA VACCINATION: ICD-10-CM

## 2024-10-10 DIAGNOSIS — E66.01 CLASS 2 SEVERE OBESITY DUE TO EXCESS CALORIES WITH SERIOUS COMORBIDITY AND BODY MASS INDEX (BMI) OF 35.0 TO 35.9 IN ADULT: ICD-10-CM

## 2024-10-10 DIAGNOSIS — R22.42 MASS OF LEFT KNEE: ICD-10-CM

## 2024-10-10 PROCEDURE — 99397 PER PM REEVAL EST PAT 65+ YR: CPT | Performed by: FAMILY MEDICINE

## 2024-10-10 PROCEDURE — 90662 IIV NO PRSV INCREASED AG IM: CPT | Performed by: FAMILY MEDICINE

## 2024-10-10 PROCEDURE — 1158F ADVNC CARE PLAN TLK DOCD: CPT | Performed by: FAMILY MEDICINE

## 2024-10-10 PROCEDURE — G0008 ADMIN INFLUENZA VIRUS VAC: HCPCS | Performed by: FAMILY MEDICINE

## 2024-10-10 PROCEDURE — 1036F TOBACCO NON-USER: CPT | Performed by: FAMILY MEDICINE

## 2024-10-10 PROCEDURE — 3075F SYST BP GE 130 - 139MM HG: CPT | Performed by: FAMILY MEDICINE

## 2024-10-10 PROCEDURE — 99214 OFFICE O/P EST MOD 30 MIN: CPT | Performed by: FAMILY MEDICINE

## 2024-10-10 PROCEDURE — 1159F MED LIST DOCD IN RCRD: CPT | Performed by: FAMILY MEDICINE

## 2024-10-10 PROCEDURE — 1160F RVW MEDS BY RX/DR IN RCRD: CPT | Performed by: FAMILY MEDICINE

## 2024-10-10 PROCEDURE — 3079F DIAST BP 80-89 MM HG: CPT | Performed by: FAMILY MEDICINE

## 2024-10-10 PROCEDURE — 1123F ACP DISCUSS/DSCN MKR DOCD: CPT | Performed by: FAMILY MEDICINE

## 2024-10-10 RX ORDER — LOSARTAN POTASSIUM AND HYDROCHLOROTHIAZIDE 25; 100 MG/1; MG/1
1 TABLET ORAL DAILY
Qty: 90 TABLET | Refills: 1 | Status: SHIPPED | OUTPATIENT
Start: 2024-10-10

## 2024-10-10 RX ORDER — METOPROLOL SUCCINATE 25 MG/1
25 TABLET, EXTENDED RELEASE ORAL DAILY
Qty: 90 TABLET | Refills: 1 | Status: SHIPPED | OUTPATIENT
Start: 2024-10-10

## 2024-10-10 RX ORDER — LEVOTHYROXINE SODIUM 75 UG/1
75 TABLET ORAL DAILY
Qty: 90 TABLET | Refills: 1 | Status: SHIPPED | OUTPATIENT
Start: 2024-10-10

## 2024-10-10 ASSESSMENT — ENCOUNTER SYMPTOMS
DIZZINESS: 0
DIARRHEA: 0
CONSTIPATION: 0
VOMITING: 0
SORE THROAT: 0
ABDOMINAL PAIN: 0
DYSURIA: 0
PALPITATIONS: 0
HEMATURIA: 0
WHEEZING: 0
FEVER: 0
SHORTNESS OF BREATH: 0
FREQUENCY: 0
COUGH: 0
POLYPHAGIA: 0
FLANK PAIN: 0
HEADACHES: 0
CHILLS: 0
RHINORRHEA: 0
POLYDIPSIA: 0
TREMORS: 0
NUMBNESS: 0

## 2024-10-10 ASSESSMENT — PATIENT HEALTH QUESTIONNAIRE - PHQ9
SUM OF ALL RESPONSES TO PHQ9 QUESTIONS 1 AND 2: 0
1. LITTLE INTEREST OR PLEASURE IN DOING THINGS: NOT AT ALL
2. FEELING DOWN, DEPRESSED OR HOPELESS: NOT AT ALL

## 2024-10-10 NOTE — PROGRESS NOTES
"Subjective   Patient ID: Rosana Hopkins is a 84 y.o. female who presents for Annual Exam, Follow-up (Hypertension, Hyperlipidemia, Hypothyroidism and labs), and Knee Mass.    Patient presents today for annual physical exam and follow-up on hypertension and hyperlipidemia. She has no chest pain, dyspnea, exertional chest pressure/discomfort, near-syncope, orthopnea, palpitations, paroxysmal nocturnal dyspnea, and syncope. Taking her medication regularly with no side effects.    She presents for follow up of hypothyroidism. Current symptoms: none. She has no change in energy level, diarrhea, heat / cold intolerance, nervousness, palpitations, or weight changes.     She complains of a lump behind the back of her left knee. Onset was 7 months ago. She notes that it feels harder then it initially did.      Review of Systems   Constitutional:  Negative for chills and fever.   HENT:  Negative for congestion, ear pain, nosebleeds, rhinorrhea and sore throat.    Respiratory:  Negative for cough, shortness of breath and wheezing.    Cardiovascular:  Negative for chest pain, palpitations and leg swelling.   Gastrointestinal:  Negative for abdominal pain, constipation, diarrhea and vomiting.   Endocrine: Negative for cold intolerance, heat intolerance, polydipsia, polyphagia and polyuria.   Genitourinary:  Negative for dysuria, flank pain, frequency, hematuria and urgency.   Neurological:  Negative for dizziness, tremors, numbness and headaches.       Objective   /90 (BP Location: Right arm)   Pulse 71   Temp 36.4 °C (97.6 °F)   Resp 18   Ht 1.651 m (5' 5\")   Wt 96.7 kg (213 lb 3.2 oz)   SpO2 96%   BMI 35.48 kg/m²     Physical Exam  Constitutional:       General: She is not in acute distress.     Appearance: Normal appearance.   HENT:      Head: Normocephalic and atraumatic.      Mouth/Throat:      Mouth: Mucous membranes are moist.      Pharynx: Oropharynx is clear. No oropharyngeal exudate or posterior " oropharyngeal erythema.   Eyes:      General: No scleral icterus.     Extraocular Movements: Extraocular movements intact.      Pupils: Pupils are equal, round, and reactive to light.   Cardiovascular:      Rate and Rhythm: Normal rate and regular rhythm.      Pulses: Normal pulses.      Heart sounds: No murmur heard.     No friction rub. No gallop.   Pulmonary:      Effort: Pulmonary effort is normal.      Breath sounds: No wheezing, rhonchi or rales.   Musculoskeletal:        Legs:       Comments: There is an oval mass which is cystic at the back of the left knee over the left popliteal fossa on the lateral aspect.  It is nontender.  It empties into the knee joint with flexion of the knee.  It is compatible with Baker's cyst.   Skin:     General: Skin is warm.      Coloration: Skin is not jaundiced or pale.      Findings: No erythema or rash.   Neurological:      General: No focal deficit present.      Mental Status: She is alert and oriented to person, place, and time.      Cranial Nerves: No cranial nerve deficit.      Sensory: No sensory deficit.      Coordination: Coordination normal.      Gait: Gait normal.         Lab on 10/04/2024   Component Date Value Ref Range Status    Glucose 10/04/2024 91  74 - 99 mg/dL Final    Sodium 10/04/2024 140  136 - 145 mmol/L Final    Potassium 10/04/2024 4.6  3.5 - 5.3 mmol/L Final    Chloride 10/04/2024 103  98 - 107 mmol/L Final    Bicarbonate 10/04/2024 29  21 - 32 mmol/L Final    Anion Gap 10/04/2024 13  10 - 20 mmol/L Final    Urea Nitrogen 10/04/2024 18  6 - 23 mg/dL Final    Creatinine 10/04/2024 0.75  0.50 - 1.05 mg/dL Final    eGFR 10/04/2024 80  >60 mL/min/1.73m*2 Final    Calculations of estimated GFR are performed using the 2021 CKD-EPI Study Refit equation without the race variable for the IDMS-Traceable creatinine methods.  https://jasn.asnjournals.org/content/early/2021/09/22/ASN.7868427701    Calcium 10/04/2024 9.7  8.6 - 10.3 mg/dL Final    Albumin  10/04/2024 4.2  3.4 - 5.0 g/dL Final    Alkaline Phosphatase 10/04/2024 56  33 - 136 U/L Final    Total Protein 10/04/2024 6.9  6.4 - 8.2 g/dL Final    AST 10/04/2024 17  9 - 39 U/L Final    Bilirubin, Total 10/04/2024 0.6  0.0 - 1.2 mg/dL Final    ALT 10/04/2024 16  7 - 45 U/L Final    Patients treated with Sulfasalazine may generate falsely decreased results for ALT.    Cholesterol 10/04/2024 196  0 - 199 mg/dL Final          Age      Desirable   Borderline High   High     0-19 Y     0 - 169       170 - 199     >/= 200    20-24 Y     0 - 189       190 - 224     >/= 225         >24 Y     0 - 199       200 - 239     >/= 240   **All ranges are based on fasting samples. Specific   therapeutic targets will vary based on patient-specific   cardiac risk.    Pediatric guidelines reference:Pediatrics 2011, 128(S5).Adult guidelines reference: NCEP ATPIII Guidelines,ANURAG 2001, 258:2486-97    Venipuncture immediately after or during the administration of Metamizole may lead to falsely low results. Testing should be performed immediately prior to Metamizole dosing.    HDL-Cholesterol 10/04/2024 53.4  mg/dL Final      Age       Very Low   Low     Normal    High    0-19 Y    < 35      < 40     40-45     ----  20-24 Y    ----     < 40      >45      ----        >24 Y      ----     < 40     40-60      >60      Cholesterol/HDL Ratio 10/04/2024 3.7   Final      Ref Values  Desirable  < 3.4  High Risk  > 5.0    LDL Calculated 10/04/2024 115 (H)  <=99 mg/dL Final                                Near   Borderline      AGE      Desirable  Optimal    High     High     Very High     0-19 Y     0 - 109     ---    110-129   >/= 130     ----    20-24 Y     0 - 119     ---    120-159   >/= 160     ----      >24 Y     0 -  99   100-129  130-159   160-189     >/=190      VLDL 10/04/2024 27  0 - 40 mg/dL Final    Triglycerides 10/04/2024 136  0 - 149 mg/dL Final       Age         Desirable   Borderline High   High     Very High   0 D-90 D    19 -  174         ----         ----        ----  91 D- 9 Y     0 -  74        75 -  99     >/= 100      ----    10-19 Y     0 -  89        90 - 129     >/= 130      ----    20-24 Y     0 - 114       115 - 149     >/= 150      ----         >24 Y     0 - 149       150 - 199    200- 499    >/= 500    Venipuncture immediately after or during the administration of Metamizole may lead to falsely low results. Testing should be performed immediately prior to Metamizole dosing.    Non HDL Cholesterol 10/04/2024 143  0 - 149 mg/dL Final          Age       Desirable   Borderline High   High     Very High     0-19 Y     0 - 119       120 - 144     >/= 145    >/= 160    20-24 Y     0 - 149       150 - 189     >/= 190      ----         >24 Y    30 mg/dL above LDL Cholesterol goal      Thyroid Stimulating Hormone 10/04/2024 2.56  0.44 - 3.98 mIU/L Final    Thyroxine, Free 10/04/2024 2.48 (H)  0.61 - 1.12 ng/dL Final     Assessment/Plan   Problem List Items Addressed This Visit       Acquired hypothyroidism     Well-controlled, continue current medications and management.         Relevant Medications    levothyroxine (Synthroid, Levoxyl) 75 mcg tablet    Other Relevant Orders    Follow Up In [facility] - PCP - Established    Annual physical exam - Primary     Recommend low-cholesterol diet, low-fat diet and low-salt diet.  The need for lifelong dietary compliance in order to reduce cardiac risk is recommended.  We will also recommend regular exercise program to improve lipid balance and overall health.  Recommend decreasing fat and cholesterol in diet, increasing aerobic exercise with a goal of 4 or more days per week         Class 2 severe obesity due to excess calories with serious comorbidity and body mass index (BMI) of 35.0 to 35.9 in adult     Continue decrease calorie diet and not more than 1500 calorie per day diet and low-fat diet.  Continue with regular exercise program.  We advised exercise at least 5 days a week  "for at least 45 minutes and also a minimum of 10,000 steps a day.  The detrimental effects of obesity on health were discussed.         Essential hypertension     We will start her on Metoprolol XL 25 mg daily and have her come in for a Blood pressure check in 1 month.  Dietary sodium restriction.  Regular aerobic exercise program is recommended to help achieve and maintain normal body weight, fitness and improve lipid balance. .  Dietary changes: Increase soluble fiber  Plant sterols 2grams per day (e.g. Benecol)  Reduce saturated fat, \"trans\" monounsaturated fatty acids, and cholesterol         Relevant Medications    losartan-hydrochlorothiazide (Hyzaar) 100-25 mg tablet    metoprolol succinate XL (Toprol-XL) 25 mg 24 hr tablet    Other Relevant Orders    Follow Up In [facility] - PCP - Established    Follow Up In [facility] - PCP - Nurse Visit    Mass of left knee     We will order Ultrasound for further evaluation.         Relevant Orders    US extremity nonvascular real time w image documentation limited anatomic specific    Mixed hyperlipidemia     The nature of cardiac risk has been fully discussed with this patient. Discussed cardiovascular risk analysis and appropriate diet with the need for lifelong measures to reduce the risk. A regular exercise program is recommended to help achieve and maintain normal body weight, fitness and improve lipid balance. Patient education provided. They understand and agree with this course of treatment. They will return with new or worsening symptoms. Patient instructed to remain current with appropriate annual health maintenance.          Relevant Orders    Follow Up In [facility] - PCP - Established    Synovial cyst of left popliteal space     We will order Ultrasound for further evaluation.         Relevant Orders    US extremity nonvascular real time w image documentation limited anatomic specific     Other Visit Diagnoses       Need for " influenza vaccination        Relevant Orders    Flu vaccine, trivalent, preservative free, HIGH-DOSE, age 65y+ (Fluzone)          Scribe Attestation  By signing my name below, I, Carolina Moffett   attest that this documentation has been prepared under the direction and in the presence of Ismael Gan MD.    Patient was identified as a fall risk. Risk prevention instructions provided.

## 2024-10-10 NOTE — ASSESSMENT & PLAN NOTE
"We will start her on Metoprolol XL 25 mg daily and have her come in for a Blood pressure check in 1 month.  Dietary sodium restriction.  Regular aerobic exercise program is recommended to help achieve and maintain normal body weight, fitness and improve lipid balance. .  Dietary changes: Increase soluble fiber  Plant sterols 2grams per day (e.g. Benecol)  Reduce saturated fat, \"trans\" monounsaturated fatty acids, and cholesterol  "

## 2024-10-10 NOTE — PATIENT INSTRUCTIONS
BMI was above normal measurement. Current weight: 96.7 kg (213 lb 3.2 oz)  Weight change since last visit (-) denotes wt loss -0.21 lbs   Weight loss needed to achieve BMI 25: 63.3 Lbs  Weight loss needed to achieve BMI 30: 33.3 Lbs  Advised to Increase physical activity.        Ways to Help Prevent Falls at Home    Quick Tips   ? Ask for help if you need it. Most people want to help!   ? Get up slowly after sitting or laying down   ? Wear a medical alert device or keep cell phone in your pocket   ? Use night lights, especially areas near a bathroom   ? Keep the items you use often within reach on a small stool or end table   ? Use an assistive device such as walker or cane, as directed by provider/physical therapy   ? Use a non-slip mat and grab bars in your bathroom. Look for home health sections for best options     Other Areas to Focus On   ? Exercise and nutrition: Regular exercise or taking a falls prevention class are great ways improve strength and balance. Don’t forget to stay hydrated and bring a snack!   ? Medicine side effects: Some medicines can make you sleepy or dizzy, which could cause a fall. Ask your healthcare provider about the side effects your medicines could cause. Be sure to let them know if you take any vitamins or supplements as well.   ? Tripping hazards: Remove items you could trip on, such as loose mats, rugs, cords, and clutter. Wear closed toe shoes with rubber soles.   ? Health and wellness: Get regular checkups with your healthcare provider, plus routine vision and hearing screenings. Talk with your healthcare provider about:   o Your medicines and the possible side effects - bring them in a bag if that is easier!   o Problems with balance or feeling dizzy   o Ways to promote bone health, such as Vitamin D and calcium supplements   o Questions or concerns about falling     *Ask your healthcare team if you have questions     [facility], 2022

## 2024-10-18 ENCOUNTER — HOSPITAL ENCOUNTER (OUTPATIENT)
Dept: RADIOLOGY | Facility: HOSPITAL | Age: 84
Discharge: HOME | End: 2024-10-18
Payer: MEDICARE

## 2024-10-18 DIAGNOSIS — R22.42 MASS OF LEFT KNEE: ICD-10-CM

## 2024-10-18 DIAGNOSIS — M71.22 SYNOVIAL CYST OF LEFT POPLITEAL SPACE: ICD-10-CM

## 2024-10-18 PROCEDURE — 76882 US LMTD JT/FCL EVL NVASC XTR: CPT

## 2024-10-24 DIAGNOSIS — M71.22 POPLITEAL CYST, LEFT: ICD-10-CM

## 2024-11-01 ENCOUNTER — OFFICE VISIT (OUTPATIENT)
Dept: ORTHOPEDIC SURGERY | Facility: CLINIC | Age: 84
End: 2024-11-01
Payer: MEDICARE

## 2024-11-01 ENCOUNTER — HOSPITAL ENCOUNTER (OUTPATIENT)
Dept: RADIOLOGY | Facility: CLINIC | Age: 84
Discharge: HOME | End: 2024-11-01
Payer: MEDICARE

## 2024-11-01 DIAGNOSIS — M17.10 ARTHRITIS OF KNEE: Primary | ICD-10-CM

## 2024-11-01 DIAGNOSIS — M25.562 LEFT KNEE PAIN, UNSPECIFIED CHRONICITY: ICD-10-CM

## 2024-11-01 PROCEDURE — 1159F MED LIST DOCD IN RCRD: CPT | Performed by: ORTHOPAEDIC SURGERY

## 2024-11-01 PROCEDURE — 1123F ACP DISCUSS/DSCN MKR DOCD: CPT | Performed by: ORTHOPAEDIC SURGERY

## 2024-11-01 PROCEDURE — 73564 X-RAY EXAM KNEE 4 OR MORE: CPT | Mod: LT

## 2024-11-01 PROCEDURE — 2500000004 HC RX 250 GENERAL PHARMACY W/ HCPCS (ALT 636 FOR OP/ED): Performed by: ORTHOPAEDIC SURGERY

## 2024-11-01 PROCEDURE — 99204 OFFICE O/P NEW MOD 45 MIN: CPT | Performed by: ORTHOPAEDIC SURGERY

## 2024-11-01 PROCEDURE — 20610 DRAIN/INJ JOINT/BURSA W/O US: CPT | Mod: LT | Performed by: ORTHOPAEDIC SURGERY

## 2024-11-01 PROCEDURE — 20610 DRAIN/INJ JOINT/BURSA W/O US: CPT | Performed by: ORTHOPAEDIC SURGERY

## 2024-11-01 PROCEDURE — 99214 OFFICE O/P EST MOD 30 MIN: CPT | Performed by: ORTHOPAEDIC SURGERY

## 2024-11-01 RX ORDER — NAPROXEN 500 MG/1
500 TABLET ORAL 2 TIMES DAILY
Qty: 60 TABLET | Refills: 0 | Status: SHIPPED | OUTPATIENT
Start: 2024-11-01 | End: 2024-12-01

## 2024-11-01 RX ADMIN — LIDOCAINE HYDROCHLORIDE 8 ML: 10 INJECTION, SOLUTION INFILTRATION; PERINEURAL at 09:20

## 2024-11-01 RX ADMIN — TRIAMCINOLONE ACETONIDE 40 MG: 40 INJECTION, SUSPENSION INTRA-ARTICULAR; INTRAMUSCULAR at 09:20

## 2024-11-03 RX ORDER — LIDOCAINE HYDROCHLORIDE 10 MG/ML
8 INJECTION, SOLUTION INFILTRATION; PERINEURAL
Status: COMPLETED | OUTPATIENT
Start: 2024-11-01 | End: 2024-11-01

## 2024-11-03 RX ORDER — TRIAMCINOLONE ACETONIDE 40 MG/ML
40 INJECTION, SUSPENSION INTRA-ARTICULAR; INTRAMUSCULAR
Status: COMPLETED | OUTPATIENT
Start: 2024-11-01 | End: 2024-11-01

## 2024-11-11 ENCOUNTER — APPOINTMENT (OUTPATIENT)
Dept: PRIMARY CARE | Facility: CLINIC | Age: 84
End: 2024-11-11
Payer: MEDICARE

## 2024-11-11 DIAGNOSIS — I10 ESSENTIAL HYPERTENSION: ICD-10-CM

## 2024-11-11 NOTE — PROGRESS NOTES
Patient presents for BP check  Denies chest pain,SOB, swelling, headaches, lightheadedness or dizziness.   Patient is currently taking Toprol-XL 25 mg  and Hyzaar 10-25 mg     BP was taken manually in the left arm - 142/64  BP was taken again after 5 minutes 136/82

## 2024-11-12 NOTE — PROGRESS NOTES
Patient call phone no longer in service, Called home number and phone just rang, unable to leave VM to return call.

## 2024-12-04 NOTE — PROGRESS NOTES
No chief complaint on file.    History of Present Illness:  We gave her a cortisone injection into the left  knee on 11/01/24.  Patient reports unfortunately she only got about a week of relief from the cortisone injection.  Left knee continues to be sore and painful.    Rosana Hopkins is a 84 y.o. female presenting to clinic with complaints of chronic left knee pain. She complains of aching pain and soreness. She's had injections that worked well in the past.     Imaging:  X-rays left knee: No acute fractures or dislocations.  Shows grade 4 medial compartment arthritis.     Assessment:   Left knee arthritis    Plan:  We reviewed the role of imaging, physical therapy, injections and the time frame to healing and correlation with outcome.  Pre-CERT for left knee gel injections  NSAID: Naproxen 500 mg sent to pharmacy.  GI side effects and medical risks discussed  Ice: 60 minutes on and off  OTC Knee brace  Exercise home program: Medically directed knee therapy / Handout given  Follow-up for gel injections once they have been approved     Physical Exam:  Well-nourished, well-developed. No acute distress. Alert and oriented x3. Responds appropriately to questioning. Good mood. Normal affect.  Physical Exam  Left Knee:  ROM: Flexion to 110  Pain along medial joint line  Positive Sarah´s test/Positive Apley Grind  Neurovascular exam normal distally  Palpable pedal pulse and good cap refill     Review of Systems:  GENERAL: Negative for malaise, significant weight loss, fever  MUSCULOSKELETAL: See HPI  NEURO:  Negative     Past Medical History:   Diagnosis Date    Xerosis of skin 01/10/2022       Medication Documentation Review Audit       Reviewed by Abi Khoury MA (Medical Assistant) on 11/01/24 at 0841      Medication Order Taking? Sig Documenting Provider Last Dose Status   albuterol 90 mcg/actuation inhaler [id number] No Inhale 2 puffs every 6 hours if needed for shortness of breath. Historical Provider, MD Not  Taking Active   ammonium lactate (Lac-Hydrin) 12 % lotion [id number] No Apply topically if needed for dry skin. Ismeal Gan MD Taking Active   biotin 10 mg tablet [id number] No Take 1 tablet (10 mg) by mouth once daily. Historical Provider, MD Taking Active   cholestyramine-aspartame (Prevalite) 4 gram packet [id number] No MIX 1 SCOOP WITH LIQUID AND  DRINK BY MOUTH ONCE DAILY Ismael Gan MD Taking Active   cranberry extract 200 mg capsule [id number] No Take 1 capsule by mouth once daily. Historical Provider, MD Taking Active   guaiFENesin (Mucinex) 600 mg 12 hr tablet [id number] No Take 1 tablet (600 mg) by mouth every 12 hours if needed for congestion. Do not crush, chew, or split. Arjun Baum MD Taking Active   krill-omega-3-dha-epa-lipids 462-10-55-50 mg capsule [id number] No Take 1 capsule by mouth once daily. Historical Provider, MD Taking Active   levothyroxine (Synthroid, Levoxyl) 75 mcg tablet [id number]  Take 1 tablet (75 mcg) by mouth once daily. Ismael Gan MD  Active   losartan-hydrochlorothiazide (Hyzaar) 100-25 mg tablet [id number]  Take 1 tablet by mouth once daily. Ismael Gan MD  Active   meclizine (Antivert) 25 mg tablet [id number] No Take 1 tablet (25 mg) by mouth 3 times a day as needed for dizziness. Arjun Baum MD Taking Active   metoprolol succinate XL (Toprol-XL) 25 mg 24 hr tablet [id number]  Take 1 tablet (25 mg) by mouth once daily. Do not crush or chew. Ismael Gan MD  Active                    No Known Allergies    Social History     Socioeconomic History    Marital status:      Spouse name: Not on file    Number of children: Not on file    Years of education: Not on file    Highest education level: Not on file   Occupational History    Not on file   Tobacco Use    Smoking status: Never     Passive exposure: Never    Smokeless tobacco: Never   Vaping Use    Vaping status: Never Used   Substance and Sexual Activity    Alcohol use: Yes    Drug use: Never    Sexual  activity: Defer   Other Topics Concern    Not on file   Social History Narrative    Not on file     Social Drivers of Health     Financial Resource Strain: Low Risk  (1/21/2024)    Overall Financial Resource Strain (CARDIA)     Difficulty of Paying Living Expenses: Not hard at all   Food Insecurity: No Food Insecurity (1/21/2024)    Hunger Vital Sign     Worried About Running Out of Food in the Last Year: Never true     Ran Out of Food in the Last Year: Never true   Transportation Needs: No Transportation Needs (1/21/2024)    PRAPARE - Transportation     Lack of Transportation (Medical): No     Lack of Transportation (Non-Medical): No   Physical Activity: Inactive (1/21/2024)    Exercise Vital Sign     Days of Exercise per Week: 0 days     Minutes of Exercise per Session: 0 min   Stress: No Stress Concern Present (1/21/2024)    [place] [employer] - Occupational Stress Questionnaire     Feeling of Stress : Not at all   Social Connections: Moderately Isolated (1/21/2024)    Social Connection and Isolation Panel [NHANES]     Frequency of Communication with Friends and Family: More than three times a week     Frequency of Social Gatherings with Friends and Family: Twice a week     Attends [employer] Services: Never     Active Member of Clubs or Organizations: No     Attends Club or Organization Meetings: Never     Marital Status:    Intimate Partner Violence: Not At Risk (1/21/2024)    Humiliation, Afraid, Rape, and Kick questionnaire     Fear of Current or Ex-Partner: No     Emotionally Abused: No     Physically Abused: No     Sexually Abused: No   Housing Stability: Low Risk  (1/21/2024)    Housing Stability Vital Sign     Unable to Pay for Housing in the Last Year: No     Number of Places Lived in the Last Year: 1     Unstable Housing in the Last Year: No       Past Surgical History:   Procedure Laterality Date    OTHER SURGICAL HISTORY  09/10/2020    Hysterectomy    OTHER SURGICAL HISTORY   09/10/2020    Knee surgery    OTHER SURGICAL HISTORY  09/10/2020    Cholecystectomy    OTHER SURGICAL HISTORY  09/10/2020    Lumpectomy       US extremity nonvascular real time w image documentation limited anatomic specific    Result Date: 10/19/2024  Interpreted By:  Rebekah Morris, STUDY: US EXTREMITY NONVASCULAR REAL TIME WITH IMAGE DOCUMENTATION LIMITED ANATOMIC SPECIFIC; ;  10/18/2024 10:29 am   INDICATION: Signs/Symptoms:left knee mass and popliteal cyst.   COMPARISON: None.   ACCESSION NUMBER(S): [id number]   ORDERING CLINICIAN: GIOVANA SULTANA   TECHNIQUE: Sonographic imaging was performed in the left popliteal fossa, including focused assessment any palpable area of concern.   FINDINGS: Images performed in the left popliteal fossa with grayscale and color Doppler imaging. A medial popliteal fossa complex fluid collection measures 4.2 x 4.5 x 3.4 cm. No internal Doppler flow. No evidence of soft tissue gas. No surrounding hyperemia. The vessels are not assessed.       Complex popliteal fossa fluid collection may represent a Baker's cyst complicated by hemorrhage. The vessels are not assessed on this exam.       Signed by: Rebekah Morris 10/19/2024 5:28 PM Dictation workstation:   [id number]         Scribe Attestation  By signing my name below, IKeiry Scribe   attest that this documentation has been prepared under the direction and in the presence of Yazan Delgado MD.

## 2024-12-06 ENCOUNTER — OFFICE VISIT (OUTPATIENT)
Dept: ORTHOPEDIC SURGERY | Facility: CLINIC | Age: 84
End: 2024-12-06
Payer: MEDICARE

## 2024-12-06 DIAGNOSIS — M17.12 ARTHRITIS OF LEFT KNEE: Primary | ICD-10-CM

## 2024-12-06 PROCEDURE — 99213 OFFICE O/P EST LOW 20 MIN: CPT | Performed by: ORTHOPAEDIC SURGERY

## 2024-12-18 NOTE — PROGRESS NOTES
Left knee Synvisc One injection.     L Inj/Asp: L knee on 12/19/2024 8:43 AM  Indications: pain  Details: 21 G needle, anterolateral approach  Medications: 6 mL hylan 48 mg/6 mL  Outcome: tolerated well, no immediate complications  Procedure, treatment alternatives, risks and benefits explained, specific risks discussed. Consent was given by the patient. Immediately prior to procedure a time out was called to verify the correct patient, procedure, equipment, support staff and site/side marked as required. Patient was prepped and draped in the usual sterile fashion.            Scribe Attestation  By signing my name below, IKeiry Scribe   attest that this documentation has been prepared under the direction and in the presence of Yazan Delgado MD.

## 2024-12-19 ENCOUNTER — OFFICE VISIT (OUTPATIENT)
Dept: ORTHOPEDIC SURGERY | Facility: CLINIC | Age: 84
End: 2024-12-19
Payer: MEDICARE

## 2024-12-19 DIAGNOSIS — M17.12 ARTHRITIS OF LEFT KNEE: Primary | ICD-10-CM

## 2025-01-09 ENCOUNTER — LAB (OUTPATIENT)
Dept: LAB | Facility: LAB | Age: 85
End: 2025-01-09
Payer: MEDICARE

## 2025-01-09 DIAGNOSIS — E78.2 MIXED HYPERLIPIDEMIA: ICD-10-CM

## 2025-01-09 DIAGNOSIS — E03.9 ACQUIRED HYPOTHYROIDISM: ICD-10-CM

## 2025-01-09 DIAGNOSIS — I10 ESSENTIAL HYPERTENSION: ICD-10-CM

## 2025-01-09 LAB
ALBUMIN SERPL BCP-MCNC: 4 G/DL (ref 3.4–5)
ALP SERPL-CCNC: 57 U/L (ref 33–136)
ALT SERPL W P-5'-P-CCNC: 18 U/L (ref 7–45)
ANION GAP SERPL CALC-SCNC: 13 MMOL/L (ref 10–20)
AST SERPL W P-5'-P-CCNC: 19 U/L (ref 9–39)
BASOPHILS # BLD AUTO: 0.03 X10*3/UL (ref 0–0.1)
BASOPHILS NFR BLD AUTO: 0.5 %
BILIRUB SERPL-MCNC: 0.8 MG/DL (ref 0–1.2)
BUN SERPL-MCNC: 25 MG/DL (ref 6–23)
CALCIUM SERPL-MCNC: 9.2 MG/DL (ref 8.6–10.3)
CHLORIDE SERPL-SCNC: 102 MMOL/L (ref 98–107)
CHOLEST SERPL-MCNC: 154 MG/DL (ref 0–199)
CHOLESTEROL/HDL RATIO: 3.4
CO2 SERPL-SCNC: 26 MMOL/L (ref 21–32)
CREAT SERPL-MCNC: 0.84 MG/DL (ref 0.5–1.05)
EGFRCR SERPLBLD CKD-EPI 2021: 69 ML/MIN/1.73M*2
EOSINOPHIL # BLD AUTO: 0.08 X10*3/UL (ref 0–0.4)
EOSINOPHIL NFR BLD AUTO: 1.2 %
ERYTHROCYTE [DISTWIDTH] IN BLOOD BY AUTOMATED COUNT: 13.2 % (ref 11.5–14.5)
GLUCOSE SERPL-MCNC: 91 MG/DL (ref 74–99)
HCT VFR BLD AUTO: 42.2 % (ref 36–46)
HDLC SERPL-MCNC: 45 MG/DL
HGB BLD-MCNC: 13.6 G/DL (ref 12–16)
IMM GRANULOCYTES # BLD AUTO: 0.02 X10*3/UL (ref 0–0.5)
IMM GRANULOCYTES NFR BLD AUTO: 0.3 % (ref 0–0.9)
LDLC SERPL CALC-MCNC: 89 MG/DL
LYMPHOCYTES # BLD AUTO: 1.96 X10*3/UL (ref 0.8–3)
LYMPHOCYTES NFR BLD AUTO: 29.8 %
MCH RBC QN AUTO: 29.3 PG (ref 26–34)
MCHC RBC AUTO-ENTMCNC: 32.2 G/DL (ref 32–36)
MCV RBC AUTO: 91 FL (ref 80–100)
MONOCYTES # BLD AUTO: 0.59 X10*3/UL (ref 0.05–0.8)
MONOCYTES NFR BLD AUTO: 9 %
NEUTROPHILS # BLD AUTO: 3.9 X10*3/UL (ref 1.6–5.5)
NEUTROPHILS NFR BLD AUTO: 59.2 %
NON HDL CHOLESTEROL: 109 MG/DL (ref 0–149)
NRBC BLD-RTO: 0 /100 WBCS (ref 0–0)
PLATELET # BLD AUTO: 214 X10*3/UL (ref 150–450)
POTASSIUM SERPL-SCNC: 4.6 MMOL/L (ref 3.5–5.3)
PROT SERPL-MCNC: 6.7 G/DL (ref 6.4–8.2)
RBC # BLD AUTO: 4.64 X10*6/UL (ref 4–5.2)
SODIUM SERPL-SCNC: 136 MMOL/L (ref 136–145)
T4 FREE SERPL-MCNC: 2.51 NG/DL (ref 0.61–1.12)
TRIGL SERPL-MCNC: 98 MG/DL (ref 0–149)
TSH SERPL-ACNC: 2.37 MIU/L (ref 0.44–3.98)
VLDL: 20 MG/DL (ref 0–40)
WBC # BLD AUTO: 6.6 X10*3/UL (ref 4.4–11.3)

## 2025-01-09 PROCEDURE — 84443 ASSAY THYROID STIM HORMONE: CPT

## 2025-01-09 PROCEDURE — 85025 COMPLETE CBC W/AUTO DIFF WBC: CPT

## 2025-01-09 PROCEDURE — 80053 COMPREHEN METABOLIC PANEL: CPT

## 2025-01-09 PROCEDURE — 80061 LIPID PANEL: CPT

## 2025-01-09 PROCEDURE — 84439 ASSAY OF FREE THYROXINE: CPT

## 2025-01-10 ENCOUNTER — APPOINTMENT (OUTPATIENT)
Dept: PRIMARY CARE | Facility: CLINIC | Age: 85
End: 2025-01-10
Payer: MEDICARE

## 2025-01-10 VITALS
SYSTOLIC BLOOD PRESSURE: 120 MMHG | OXYGEN SATURATION: 97 % | WEIGHT: 213 LBS | TEMPERATURE: 97.5 F | DIASTOLIC BLOOD PRESSURE: 68 MMHG | HEIGHT: 65 IN | BODY MASS INDEX: 35.49 KG/M2 | HEART RATE: 51 BPM | RESPIRATION RATE: 17 BRPM

## 2025-01-10 DIAGNOSIS — E03.9 ACQUIRED HYPOTHYROIDISM: ICD-10-CM

## 2025-01-10 DIAGNOSIS — I10 ESSENTIAL HYPERTENSION: ICD-10-CM

## 2025-01-10 DIAGNOSIS — E66.01 CLASS 2 SEVERE OBESITY DUE TO EXCESS CALORIES WITH SERIOUS COMORBIDITY AND BODY MASS INDEX (BMI) OF 35.0 TO 35.9 IN ADULT: ICD-10-CM

## 2025-01-10 DIAGNOSIS — Z00.00 ROUTINE GENERAL MEDICAL EXAMINATION AT HEALTH CARE FACILITY: Primary | ICD-10-CM

## 2025-01-10 DIAGNOSIS — J45.909 REACTIVE AIRWAY DISEASE WITHOUT COMPLICATION, UNSPECIFIED ASTHMA SEVERITY, UNSPECIFIED WHETHER PERSISTENT (HHS-HCC): ICD-10-CM

## 2025-01-10 DIAGNOSIS — M05.79 RHEUMATOID ARTHRITIS INVOLVING MULTIPLE SITES WITH POSITIVE RHEUMATOID FACTOR (MULTI): ICD-10-CM

## 2025-01-10 DIAGNOSIS — I73.9 PVD (PERIPHERAL VASCULAR DISEASE) (CMS-HCC): ICD-10-CM

## 2025-01-10 DIAGNOSIS — E78.2 MIXED HYPERLIPIDEMIA: ICD-10-CM

## 2025-01-10 DIAGNOSIS — E66.812 CLASS 2 SEVERE OBESITY DUE TO EXCESS CALORIES WITH SERIOUS COMORBIDITY AND BODY MASS INDEX (BMI) OF 35.0 TO 35.9 IN ADULT: ICD-10-CM

## 2025-01-10 RX ORDER — LEVOTHYROXINE SODIUM 75 UG/1
75 TABLET ORAL DAILY
Qty: 90 TABLET | Refills: 1 | Status: SHIPPED | OUTPATIENT
Start: 2025-01-10

## 2025-01-10 RX ORDER — LOSARTAN POTASSIUM AND HYDROCHLOROTHIAZIDE 25; 100 MG/1; MG/1
1 TABLET ORAL DAILY
Qty: 90 TABLET | Refills: 1 | Status: SHIPPED | OUTPATIENT
Start: 2025-01-10

## 2025-01-10 RX ORDER — METOPROLOL SUCCINATE 25 MG/1
25 TABLET, EXTENDED RELEASE ORAL DAILY
Qty: 90 TABLET | Refills: 1 | Status: SHIPPED | OUTPATIENT
Start: 2025-01-10

## 2025-01-10 ASSESSMENT — ENCOUNTER SYMPTOMS
ABDOMINAL PAIN: 0
COUGH: 0
NUMBNESS: 0
DIZZINESS: 0
DYSURIA: 0
TREMORS: 0
FREQUENCY: 0
CONSTIPATION: 0
DIARRHEA: 0
CHILLS: 0
WHEEZING: 0
PALPITATIONS: 0
SORE THROAT: 0
HEMATURIA: 0
RHINORRHEA: 0
HEADACHES: 0
SHORTNESS OF BREATH: 0
FEVER: 0
VOMITING: 0

## 2025-01-10 ASSESSMENT — ACTIVITIES OF DAILY LIVING (ADL)
MANAGING_FINANCES: INDEPENDENT
DOING_HOUSEWORK: INDEPENDENT
GROCERY_SHOPPING: INDEPENDENT
BATHING: INDEPENDENT
DRESSING: INDEPENDENT
TAKING_MEDICATION: INDEPENDENT

## 2025-01-10 ASSESSMENT — PATIENT HEALTH QUESTIONNAIRE - PHQ9
2. FEELING DOWN, DEPRESSED OR HOPELESS: NOT AT ALL
1. LITTLE INTEREST OR PLEASURE IN DOING THINGS: NOT AT ALL
SUM OF ALL RESPONSES TO PHQ9 QUESTIONS 1 AND 2: 0

## 2025-01-10 NOTE — PROGRESS NOTES
Subjective   Patient ID: Rosana Hopkins is a 84 y.o. female who presents for Medicare Annual Wellness Visit Subsequent and Follow-up (Hypertension, Hyperlipidemia, Hypothyroidism and Labs).    She presents for Annual Medicare Wellness Visit and follow-up on hypertension and hyperlipidemia. She has no chest pain, dyspnea, exertional chest pressure/discomfort, near-syncope, orthopnea, palpitations, paroxysmal nocturnal dyspnea, and syncope. Taking her medication regularly with no side effects.    She presents for follow up of hypothyroidism. Current symptoms: none. She has no change in energy level, diarrhea, heat / cold intolerance, nervousness, palpitations, or weight changes.       Past Medical, Surgical, and Family History reviewed and updated in chart.    Reviewed all medications by prescribing practitioner or clinical pharmacist (such as prescriptions, OTCs, herbal therapies and supplements) and documented in the medical record.    Patient Self Assessment of Health Status  Patient Self Assessment: Good    Nutrition and Exercise  Current Diet: Unhealthy Diet  Adequate Fluid Intake: Yes  Caffeine: Yes  Exercise Frequency: Regularly    Functional Ability/Level of Safety  Cognitive Impairment Observed: No cognitive impairment observed    Home Safety Risk Factors: Loose rugs, No grab bars, bathroom      Review of Systems   Constitutional:  Negative for chills and fever.   HENT:  Negative for congestion, ear pain, nosebleeds, rhinorrhea and sore throat.    Respiratory:  Negative for cough, shortness of breath and wheezing.    Cardiovascular:  Negative for chest pain, palpitations and leg swelling.   Gastrointestinal:  Negative for abdominal pain, constipation, diarrhea and vomiting.   Genitourinary:  Negative for dysuria, frequency and hematuria.   Neurological:  Negative for dizziness, tremors, numbness and headaches.       Objective   /68   Pulse 51   Temp 36.4 °C (97.5 °F)   Resp 17   Ht 1.651 m (5'  "5\")   Wt 96.6 kg (213 lb)   SpO2 97%   BMI 35.45 kg/m²     Physical Exam  Constitutional:       General: She is not in acute distress.     Appearance: Normal appearance.   HENT:      Head: Normocephalic and atraumatic.      Mouth/Throat:      Mouth: Mucous membranes are moist.      Pharynx: Oropharynx is clear. No oropharyngeal exudate or posterior oropharyngeal erythema.   Eyes:      General: No scleral icterus.     Extraocular Movements: Extraocular movements intact.      Pupils: Pupils are equal, round, and reactive to light.   Cardiovascular:      Rate and Rhythm: Normal rate and regular rhythm.      Pulses: Normal pulses.      Heart sounds: No murmur heard.     No friction rub. No gallop.   Pulmonary:      Effort: Pulmonary effort is normal.      Breath sounds: No wheezing, rhonchi or rales.   Skin:     General: Skin is warm.      Coloration: Skin is not jaundiced or pale.      Findings: No erythema or rash.   Neurological:      General: No focal deficit present.      Mental Status: She is alert and oriented to person, place, and time.      Cranial Nerves: No cranial nerve deficit.      Sensory: No sensory deficit.      Coordination: Coordination normal.      Gait: Gait normal.         Lab on 01/09/2025   Component Date Value Ref Range Status    WBC 01/09/2025 6.6  4.4 - 11.3 x10*3/uL Final    nRBC 01/09/2025 0.0  0.0 - 0.0 /100 WBCs Final    RBC 01/09/2025 4.64  4.00 - 5.20 x10*6/uL Final    Hemoglobin 01/09/2025 13.6  12.0 - 16.0 g/dL Final    Hematocrit 01/09/2025 42.2  36.0 - 46.0 % Final    MCV 01/09/2025 91  80 - 100 fL Final    MCH 01/09/2025 29.3  26.0 - 34.0 pg Final    MCHC 01/09/2025 32.2  32.0 - 36.0 g/dL Final    RDW 01/09/2025 13.2  11.5 - 14.5 % Final    Platelets 01/09/2025 214  150 - 450 x10*3/uL Final    Neutrophils % 01/09/2025 59.2  40.0 - 80.0 % Final    Immature Granulocytes %, Automated 01/09/2025 0.3  0.0 - 0.9 % Final    Immature Granulocyte Count (IG) includes promyelocytes, " myelocytes and metamyelocytes but does not include bands. Percent differential counts (%) should be interpreted in the context of the absolute cell counts (cells/UL).    Lymphocytes % 01/09/2025 29.8  13.0 - 44.0 % Final    Monocytes % 01/09/2025 9.0  2.0 - 10.0 % Final    Eosinophils % 01/09/2025 1.2  0.0 - 6.0 % Final    Basophils % 01/09/2025 0.5  0.0 - 2.0 % Final    Neutrophils Absolute 01/09/2025 3.90  1.60 - 5.50 x10*3/uL Final    Percent differential counts (%) should be interpreted in the context of the absolute cell counts (cells/uL).    Immature Granulocytes Absolute, Au* 01/09/2025 0.02  0.00 - 0.50 x10*3/uL Final    Lymphocytes Absolute 01/09/2025 1.96  0.80 - 3.00 x10*3/uL Final    Monocytes Absolute 01/09/2025 0.59  0.05 - 0.80 x10*3/uL Final    Eosinophils Absolute 01/09/2025 0.08  0.00 - 0.40 x10*3/uL Final    Basophils Absolute 01/09/2025 0.03  0.00 - 0.10 x10*3/uL Final    Thyroxine, Free 01/09/2025 2.51 (H)  0.61 - 1.12 ng/dL Final    Thyroid Stimulating Hormone 01/09/2025 2.37  0.44 - 3.98 mIU/L Final    Cholesterol 01/09/2025 154  0 - 199 mg/dL Final          Age      Desirable   Borderline High   High     0-19 Y     0 - 169       170 - 199     >/= 200    20-24 Y     0 - 189       190 - 224     >/= 225         >24 Y     0 - 199       200 - 239     >/= 240   **All ranges are based on fasting samples. Specific   therapeutic targets will vary based on patient-specific   cardiac risk.    Pediatric guidelines reference:Pediatrics 2011, 128(S5).Adult guidelines reference: NCEP ATPIII Guidelines,ANURAG 2001, 258:1846-39    Venipuncture immediately after or during the administration of Metamizole may lead to falsely low results. Testing should be performed immediately prior to Metamizole dosing.    HDL-Cholesterol 01/09/2025 45.0  mg/dL Final      Age       Very Low   Low     Normal    High    0-19 Y    < 35      < 40     40-45     ----  20-24 Y    ----     < 40      >45      ----        >24 Y      ----      < 40     40-60      >60      Cholesterol/HDL Ratio 01/09/2025 3.4   Final      Ref Values  Desirable  < 3.4  High Risk  > 5.0    LDL Calculated 01/09/2025 89  <=99 mg/dL Final                                Near   Borderline      AGE      Desirable  Optimal    High     High     Very High     0-19 Y     0 - 109     ---    110-129   >/= 130     ----    20-24 Y     0 - 119     ---    120-159   >/= 160     ----      >24 Y     0 -  99   100-129  130-159   160-189     >/=190      VLDL 01/09/2025 20  0 - 40 mg/dL Final    Triglycerides 01/09/2025 98  0 - 149 mg/dL Final    Age              Desirable        Borderline         High        Very High  SEX:B           mg/dL             mg/dL               mg/dL      mg/dL  <=14D                       ----               ----        ----  15D-365D                    ----               ----        ----  1Y-9Y           0-74               75-99             >=100       ----  10Y-19Y        0-89                            >=130       ----  20Y-24Y        0-114             115-149             >=150      ----  >= 25Y         0-149             150-199             200-499    >=500      Venipuncture immediately after or during the administration of Metamizole may lead to falsely low results. Testing should be performed immediately prior to Metamizole dosing.    Non HDL Cholesterol 01/09/2025 109  0 - 149 mg/dL Final          Age       Desirable   Borderline High   High     Very High     0-19 Y     0 - 119       120 - 144     >/= 145    >/= 160    20-24 Y     0 - 149       150 - 189     >/= 190      ----         >24 Y    30 mg/dL above LDL Cholesterol goal      Glucose 01/09/2025 91  74 - 99 mg/dL Final    Sodium 01/09/2025 136  136 - 145 mmol/L Final    Potassium 01/09/2025 4.6  3.5 - 5.3 mmol/L Final    Chloride 01/09/2025 102  98 - 107 mmol/L Final    Bicarbonate 01/09/2025 26  21 - 32 mmol/L Final    Anion Gap 01/09/2025 13  10 - 20 mmol/L Final    Urea Nitrogen  01/09/2025 25 (H)  6 - 23 mg/dL Final    Creatinine 01/09/2025 0.84  0.50 - 1.05 mg/dL Final    eGFR 01/09/2025 69  >60 mL/min/1.73m*2 Final    Calculations of estimated GFR are performed using the 2021 CKD-EPI Study Refit equation without the race variable for the IDMS-Traceable creatinine methods.  https://jasn.asnjournals.org/content/early/2021/09/22/ASN.5766817084    Calcium 01/09/2025 9.2  8.6 - 10.3 mg/dL Final    Albumin 01/09/2025 4.0  3.4 - 5.0 g/dL Final    Alkaline Phosphatase 01/09/2025 57  33 - 136 U/L Final    Total Protein 01/09/2025 6.7  6.4 - 8.2 g/dL Final    AST 01/09/2025 19  9 - 39 U/L Final    Bilirubin, Total 01/09/2025 0.8  0.0 - 1.2 mg/dL Final    ALT 01/09/2025 18  7 - 45 U/L Final    Patients treated with Sulfasalazine may generate falsely decreased results for ALT.       Assessment/Plan   Problem List Items Addressed This Visit       Acquired hypothyroidism    Relevant Medications    levothyroxine (Synthroid, Levoxyl) 75 mcg tablet    Other Relevant Orders    CBC and Auto Differential (Completed)    T4, free (Completed)    TSH (Completed)    Follow Up In [facility] - PCP    Thyroid Stimulating Hormone    Thyroxine, Free    Class 2 severe obesity due to excess calories with serious comorbidity and body mass index (BMI) of 35.0 to 35.9 in adult     Continue decrease calorie diet and not more than 1500 calorie per day diet and low-fat diet.  Continue with regular exercise program.  We advised exercise at least 5 days a week for at least 45 minutes and also a minimum of 10,000 steps a day.  The detrimental effects of obesity on health were discussed.         Essential hypertension    Relevant Medications    metoprolol succinate XL (Toprol-XL) 25 mg 24 hr tablet    losartan-hydrochlorothiazide (Hyzaar) 100-25 mg tablet    Other Relevant Orders    CBC and Auto Differential (Completed)    Comprehensive metabolic panel (Completed)    CBC and Auto Differential    Comprehensive Metabolic  Panel    Follow Up In [facility] - PCP    Lipid Panel    Mixed hyperlipidemia    Relevant Orders    CBC and Auto Differential (Completed)    Lipid panel (Completed)    Comprehensive metabolic panel (Completed)    CBC and Auto Differential    Comprehensive Metabolic Panel    Follow Up In [facility] - PCP    Lipid Panel    PVD (peripheral vascular disease) (CMS-HCC)     Chronic Condition Documentation : Stable based on symptoms and exam.  Continue established treatment plan and follow-up at least yearly.         Reactive airway disease ([facility])     Chronic Condition Documentation : Stable based on symptoms and exam.  Continue established treatment plan and follow-up at least yearly.         Rheumatoid arthritis involving multiple sites with positive rheumatoid factor (Multi)     Chronic Condition Documentation : Stable based on symptoms and exam.  Continue established treatment plan and follow-up at least yearly.         Routine general medical examination at [facility] care facility - Primary     Scribe Attestation  By signing my name below, ITravis Scribe   attest that this documentation has been prepared under the direction and in the presence of Ismael Gan MD.    Patient was identified as a fall risk. Risk prevention instructions provided.

## 2025-01-10 NOTE — PATIENT INSTRUCTIONS

## 2025-01-15 NOTE — PROGRESS NOTES
No chief complaint on file.    History of Present Illness:  01/16/24: Patient states that the knee is feeling significantly better after the gel injection, notes she is back to walking the dog outside without any issues.    12/19/24: We gave her a Synvisc One injection in the left knee at this visit.     12/06/24: We gave her a cortisone injection into the left  knee on 11/01/24.  Patient reports unfortunately she only got about a week of relief from the cortisone injection.  Left knee continues to be sore and painful.    11/01/24: Rosana Hopkins is a 84 y.o. female presenting to clinic with complaints of chronic left knee pain. She complains of aching pain and soreness. She's had injections that worked well in the past.     Imaging:  X-rays left knee: No acute fractures or dislocations.  Shows grade 4 medial compartment arthritis.     Assessment:   Left knee arthritis    Plan:  We reviewed the role of imaging, physical therapy, injections and the time frame to healing and correlation with outcome.  NSAID: Naproxen 500 mg sent to pharmacy.  GI side effects and medical risks discussed  Ice: 60 minutes on and off  Exercise home program: Medically directed knee therapy / Handout given  Follow-up as needed.  Can repeat gel injections in the future     Physical Exam:  Well-nourished, well-developed. No acute distress. Alert and oriented x3. Responds appropriately to questioning. Good mood. Normal affect.  Physical Exam  Left Knee:  ROM: Flexion to 110  Pain along medial joint line  Positive Sarah´s test/Positive Apley Grind  Neurovascular exam normal distally  Palpable pedal pulse and good cap refill     Review of Systems:  GENERAL: Negative for malaise, significant weight loss, fever  MUSCULOSKELETAL: See HPI  NEURO:  Negative     Past Medical History:   Diagnosis Date    Xerosis of skin 01/10/2022       Medication Documentation Review Audit       Reviewed by Ismael Gan MD (Physician) on 01/10/25 at 1003       Medication Order Taking? Sig Documenting Provider Last Dose Status   albuterol 90 mcg/actuation inhaler [id number] Yes Inhale 2 puffs every 6 hours if needed for shortness of breath. Historical Provider, MD Not Taking Active   ammonium lactate (Lac-Hydrin) 12 % lotion [id number] Yes Apply topically if needed for dry skin. Ismael Gan MD Taking Active   biotin 10 mg tablet [id number] Yes Take 1 tablet (10 mg) by mouth once daily. Historical Provider, MD Taking Active   cholestyramine-aspartame (Prevalite) 4 gram packet [id number] Yes MIX 1 SCOOP WITH LIQUID AND  DRINK BY MOUTH ONCE DAILY Ismael Gan MD Taking Active   cranberry extract 200 mg capsule [id number] Yes Take 1 capsule by mouth once daily. Historical Provider, MD Taking Active   guaiFENesin (Mucinex) 600 mg 12 hr tablet [id number] Yes Take 1 tablet (600 mg) by mouth every 12 hours if needed for congestion. Do not crush, chew, or split. Arjun Baum MD Taking Active   krill-omega-3-dha-epa-lipids 136-82-91-50 mg capsule [id number] Yes Take 1 capsule by mouth once daily. Historical Provider, MD Taking Active   levothyroxine (Synthroid, Levoxyl) 75 mcg tablet [id number] Yes Take 1 tablet (75 mcg) by mouth once daily. Ismael Gan MD  Active   losartan-hydrochlorothiazide (Hyzaar) 100-25 mg tablet [id number] Yes Take 1 tablet by mouth once daily. Ismael Gan MD  Active   meclizine (Antivert) 25 mg tablet [id number] Yes Take 1 tablet (25 mg) by mouth 3 times a day as needed for dizziness. Arjun Baum MD Taking Active   metoprolol succinate XL (Toprol-XL) 25 mg 24 hr tablet [id number] Yes Take 1 tablet (25 mg) by mouth once daily. Do not crush or chew. Ismael Gan MD  Active                    No Known Allergies    Social History     Socioeconomic History    Marital status:      Spouse name: Not on file    Number of children: Not on file    Years of education: Not on file    Highest education level: Not on file   Occupational History    Not  on file   Tobacco Use    Smoking status: Never     Passive exposure: Never    Smokeless tobacco: Never   Vaping Use    Vaping status: Never Used   Substance and Sexual Activity    Alcohol use: Yes    Drug use: Never    Sexual activity: Defer   Other Topics Concern    Not on file   Social History Narrative    Not on file     Social Drivers of Health     Financial Resource Strain: Low Risk  (1/21/2024)    Overall Financial Resource Strain (CARDIA)     Difficulty of Paying Living Expenses: Not hard at all   Food Insecurity: No Food Insecurity (1/21/2024)    Hunger Vital Sign     Worried About Running Out of Food in the Last Year: Never true     Ran Out of Food in the Last Year: Never true   Transportation Needs: No Transportation Needs (1/21/2024)    PRAPARE - Transportation     Lack of Transportation (Medical): No     Lack of Transportation (Non-Medical): No   Physical Activity: Inactive (1/21/2024)    Exercise Vital Sign     Days of Exercise per Week: 0 days     Minutes of Exercise per Session: 0 min   Stress: No Stress Concern Present (1/21/2024)    [employer] - Occupational Stress Questionnaire     Feeling of Stress : Not at all   Social Connections: Moderately Isolated (1/21/2024)    Social Connection and Isolation Panel [NHANES]     Frequency of Communication with Friends and Family: More than three times a week     Frequency of Social Gatherings with Friends and Family: Twice a week     Attends Christianity Services: Never     Active Member of Clubs or Organizations: No     Attends Club or Organization Meetings: Never     Marital Status:    Intimate Partner Violence: Not At Risk (1/21/2024)    Humiliation, Afraid, Rape, and Kick questionnaire     Fear of Current or Ex-Partner: No     Emotionally Abused: No     Physically Abused: No     Sexually Abused: No   Housing Stability: Low Risk  (1/21/2024)    Housing Stability Vital Sign     Unable to Pay for Housing in the Last Year: No      Number of Places Lived in the Last Year: 1     Unstable Housing in the Last Year: No       Past Surgical History:   Procedure Laterality Date    OTHER SURGICAL HISTORY  09/10/2020    Hysterectomy    OTHER SURGICAL HISTORY  09/10/2020    Knee surgery    OTHER SURGICAL HISTORY  09/10/2020    Cholecystectomy    OTHER SURGICAL HISTORY  09/10/2020    Lumpectomy       US extremity nonvascular real time w image documentation limited anatomic specific    Result Date: 10/19/2024  Interpreted By:  Rebekah Morris, STUDY: US EXTREMITY NONVASCULAR REAL TIME WITH IMAGE DOCUMENTATION LIMITED ANATOMIC SPECIFIC; ;  10/18/2024 10:29 am   INDICATION: Signs/Symptoms:left knee mass and popliteal cyst.   COMPARISON: None.   ACCESSION NUMBER(S): [id number]   ORDERING CLINICIAN: GIOVANA SULTANA   TECHNIQUE: Sonographic imaging was performed in the left popliteal fossa, including focused assessment any palpable area of concern.   FINDINGS: Images performed in the left popliteal fossa with grayscale and color Doppler imaging. A medial popliteal fossa complex fluid collection measures 4.2 x 4.5 x 3.4 cm. No internal Doppler flow. No evidence of soft tissue gas. No surrounding hyperemia. The vessels are not assessed.       Complex popliteal fossa fluid collection may represent a Baker's cyst complicated by hemorrhage. The vessels are not assessed on this exam.       Signed by: Rebekah Morris 10/19/2024 5:28 PM Dictation workstation:   [id number]         Scribe Attestation  By signing my name below, Keiry ARMANDO, [employer]   attest that this documentation has been prepared under the direction and in the presence of Yazan Delgado MD.

## 2025-01-16 ENCOUNTER — APPOINTMENT (OUTPATIENT)
Dept: ORTHOPEDIC SURGERY | Facility: CLINIC | Age: 85
End: 2025-01-16
Payer: MEDICARE

## 2025-01-16 DIAGNOSIS — M17.12 ARTHRITIS OF LEFT KNEE: Primary | ICD-10-CM

## 2025-01-16 PROCEDURE — 1123F ACP DISCUSS/DSCN MKR DOCD: CPT | Performed by: ORTHOPAEDIC SURGERY

## 2025-01-16 PROCEDURE — 99213 OFFICE O/P EST LOW 20 MIN: CPT | Performed by: ORTHOPAEDIC SURGERY

## 2025-06-13 DIAGNOSIS — E78.2 MIXED HYPERLIPIDEMIA: ICD-10-CM

## 2025-06-13 DIAGNOSIS — I10 ESSENTIAL HYPERTENSION: ICD-10-CM

## 2025-06-13 RX ORDER — METOPROLOL SUCCINATE 25 MG/1
25 TABLET, EXTENDED RELEASE ORAL DAILY
Qty: 90 TABLET | Refills: 0 | Status: SHIPPED | OUTPATIENT
Start: 2025-06-13

## 2025-06-13 RX ORDER — CHOLESTYRAMINE 4 G/5.5G
POWDER, FOR SUSPENSION ORAL
Qty: 693 G | Refills: 0 | Status: SHIPPED | OUTPATIENT
Start: 2025-06-13

## 2025-06-18 ENCOUNTER — TELEPHONE (OUTPATIENT)
Dept: PHARMACY | Facility: HOSPITAL | Age: 85
End: 2025-06-18
Payer: MEDICARE

## 2025-06-18 NOTE — TELEPHONE ENCOUNTER
Population Health: Outreach by Ambulatory Pharmacy Team    Patient: Rosana Hopkins  Primary Care Provider (PCP): Ismael Gan MD  Payor: [facility]  Reason: Adherence  Medication(s): LOSARTAN/HCT -25  Outcome: No Answer/Invalid Number/Voicemail Box Full    CIERRA PRICE   Pharmacy Intern

## 2025-07-10 ENCOUNTER — APPOINTMENT (OUTPATIENT)
Dept: PRIMARY CARE | Facility: CLINIC | Age: 85
End: 2025-07-10
Payer: MEDICARE

## 2025-07-30 ENCOUNTER — HOSPITAL ENCOUNTER (OUTPATIENT)
Dept: RADIOLOGY | Facility: HOSPITAL | Age: 85
Discharge: HOME | End: 2025-07-30
Payer: MEDICARE

## 2025-07-30 DIAGNOSIS — N28.1 RENAL CYST: ICD-10-CM

## 2025-07-30 PROCEDURE — 76770 US EXAM ABDO BACK WALL COMP: CPT

## 2025-07-30 PROCEDURE — 76770 US EXAM ABDO BACK WALL COMP: CPT | Performed by: RADIOLOGY

## 2025-08-06 ENCOUNTER — APPOINTMENT (OUTPATIENT)
Dept: UROLOGY | Facility: CLINIC | Age: 85
End: 2025-08-06
Payer: MEDICARE

## 2025-08-06 VITALS
HEART RATE: 67 BPM | WEIGHT: 225.53 LBS | DIASTOLIC BLOOD PRESSURE: 67 MMHG | BODY MASS INDEX: 37.58 KG/M2 | RESPIRATION RATE: 16 BRPM | HEIGHT: 65 IN | SYSTOLIC BLOOD PRESSURE: 138 MMHG

## 2025-08-06 DIAGNOSIS — N28.1 RENAL CYST: Primary | ICD-10-CM

## 2025-08-06 PROCEDURE — G2211 COMPLEX E/M VISIT ADD ON: HCPCS | Performed by: UROLOGY

## 2025-08-06 PROCEDURE — 1036F TOBACCO NON-USER: CPT | Performed by: UROLOGY

## 2025-08-06 PROCEDURE — 3075F SYST BP GE 130 - 139MM HG: CPT | Performed by: UROLOGY

## 2025-08-06 PROCEDURE — 1126F AMNT PAIN NOTED NONE PRSNT: CPT | Performed by: UROLOGY

## 2025-08-06 PROCEDURE — 1160F RVW MEDS BY RX/DR IN RCRD: CPT | Performed by: UROLOGY

## 2025-08-06 PROCEDURE — 3078F DIAST BP <80 MM HG: CPT | Performed by: UROLOGY

## 2025-08-06 PROCEDURE — 99214 OFFICE O/P EST MOD 30 MIN: CPT | Performed by: UROLOGY

## 2025-08-06 PROCEDURE — 1159F MED LIST DOCD IN RCRD: CPT | Performed by: UROLOGY

## 2025-08-06 ASSESSMENT — ENCOUNTER SYMPTOMS
DIFFICULTY URINATING: 0
HEMATURIA: 0
DYSURIA: 0

## 2025-08-06 ASSESSMENT — PAIN SCALES - GENERAL: PAINLEVEL_OUTOF10: 0-NO PAIN

## 2025-08-06 NOTE — PROGRESS NOTES
Patient denies any pain today. Patient has not had any recent surgeries or hospital admits. Patient denies any concerns about falling or safety. Patient has no new urinary issues. Patient would like her  seen by you. CV    Review of Systems   Genitourinary:  Positive for enuresis and urgency. Negative for decreased urine volume, difficulty urinating, dysuria and hematuria.   All other systems reviewed and are negative.

## 2025-08-06 NOTE — PROGRESS NOTES
"84 year-old white female referred by Dr. Ismael Gan for \"renal cyst\".  Has a history of pyelonephritis, acute UTIs and renal cysts.  Urinalysis showed no blood.  Renal ultrasound showed bilateral parapelvic cysts.  Urine culture no growth.  Patient is not employed.  No family history of breast or prostate cancer.  Patient has never smoked cigarettes.     March 23, 2024, patient arrives alone.  Her  waits in their car in the parking lot.  She has no urologic complaints.  I have reviewed the natural history of cystic disease in the kidneys.  We discussed simple cysts, complex cysts, and tumors.  At this time she has bilateral simple cysts and we will continue to follow.  She will return in August with a renal ultrasound.     October 4, 2024, patient arrives alone.  She has no new urinary complaints.  Her renal ultrasound shows bilateral simple cysts, unchanged.  We will continue to follow.  She will return in August.    August 6, 2025, patient arrives alone.  Her  stays in the waiting room.  She has no new urinary complaints.  Her renal ultrasound shows multiple bilateral renal parapelvic cysts, similar to the previous exam.  Remainder of the exam was negative.  We will continue to follow and she will return in August.     PLAN:     1.  The patient will return in August 2026 with a renal ultrasound.     Physical Exam  Vitals and nursing note reviewed.   Constitutional:       Appearance: Normal appearance.   HENT:      Head: Normocephalic and atraumatic.   Pulmonary:      Effort: Pulmonary effort is normal.   Abdominal:      Palpations: Abdomen is soft.      Tenderness: There is no abdominal tenderness.   Musculoskeletal:         General: Normal range of motion.      Cervical back: Normal range of motion and neck supple.   Neurological:      General: No focal deficit present.      Mental Status: She is alert and oriented to person, place, and time.   Psychiatric:         Mood and Affect: Mood normal.   "       Behavior: Behavior normal.        This note was created with voice-recognition software and was not corrected for typographical or grammatical errors.

## 2025-08-06 NOTE — PATIENT INSTRUCTIONS
It was very nice to see you again today.   We had an in-depth review of simple cysts, complex cysts and kidney tumors.  We have repeated your ultrasound and at this time you have simple cysts in each kidney which we will continue to follow.  I will see you again in August.      This note was created with voice-recognition software and was not corrected for typographical or grammatical errors.  
none

## 2025-08-06 NOTE — LETTER
"August 6, 2025     Ismael Gan MD  [address] [address]    Patient: Rosana Hopkins   YOB: 1940   Date of Visit: 8/6/2025       Dear Dr. Ismael Gan MD:    Thank you for referring Rosana Hopkins to me for evaluation. Below are my notes for this consultation.  If you have questions, please do not hesitate to call me. I look forward to following your patient along with you.       Sincerely,     Jay Valdez MD      CC: No Recipients  ______________________________________________________________________________________    84 year-old white female referred by Dr. Ismael Gan for \"renal cyst\".  Has a history of pyelonephritis, acute UTIs and renal cysts.  Urinalysis showed no blood.  Renal ultrasound showed bilateral parapelvic cysts.  Urine culture no growth.  Patient is not employed.  No family history of breast or prostate cancer.  Patient has never smoked cigarettes.     March 23, 2024, patient arrives alone.  Her  waits in their car in the parking lot.  She has no urologic complaints.  I have reviewed the natural history of cystic disease in the kidneys.  We discussed simple cysts, complex cysts, and tumors.  At this time she has bilateral simple cysts and we will continue to follow.  She will return in August with a renal ultrasound.     October 4, 2024, patient arrives alone.  She has no new urinary complaints.  Her renal ultrasound shows bilateral simple cysts, unchanged.  We will continue to follow.  She will return in August.    August 6, 2025, patient arrives alone.  Her  stays in the waiting room.  She has no new urinary complaints.  Her renal ultrasound shows multiple bilateral renal parapelvic cysts, similar to the previous exam.  Remainder of the exam was negative.  We will continue to follow and she will return in August.     PLAN:     1.  The patient will return in August 2026 with a renal ultrasound.     Physical Exam  Vitals and " nursing note reviewed.   Constitutional:       Appearance: Normal appearance.   HENT:      Head: Normocephalic and atraumatic.   Pulmonary:      Effort: Pulmonary effort is normal.   Abdominal:      Palpations: Abdomen is soft.      Tenderness: There is no abdominal tenderness.   Musculoskeletal:         General: Normal range of motion.      Cervical back: Normal range of motion and neck supple.   Neurological:      General: No focal deficit present.      Mental Status: She is alert and oriented to person, place, and time.   Psychiatric:         Mood and Affect: Mood normal.         Behavior: Behavior normal.        This note was created with voice-recognition software and was not corrected for typographical or grammatical errors.

## 2026-08-05 ENCOUNTER — APPOINTMENT (OUTPATIENT)
Dept: UROLOGY | Facility: CLINIC | Age: 86
End: 2026-08-05
Payer: MEDICARE